# Patient Record
Sex: MALE | Race: ASIAN
[De-identification: names, ages, dates, MRNs, and addresses within clinical notes are randomized per-mention and may not be internally consistent; named-entity substitution may affect disease eponyms.]

---

## 2020-07-23 ENCOUNTER — HOSPITAL ENCOUNTER (INPATIENT)
Dept: HOSPITAL 54 - ER | Age: 85
LOS: 4 days | Discharge: ANOTHER HOSPITAL | DRG: 885 | End: 2020-07-27
Attending: INTERNAL MEDICINE | Admitting: PSYCHIATRY & NEUROLOGY
Payer: MEDICARE

## 2020-07-23 VITALS — HEIGHT: 65 IN | BODY MASS INDEX: 25.99 KG/M2 | WEIGHT: 156 LBS

## 2020-07-23 VITALS — SYSTOLIC BLOOD PRESSURE: 137 MMHG | DIASTOLIC BLOOD PRESSURE: 72 MMHG

## 2020-07-23 VITALS — SYSTOLIC BLOOD PRESSURE: 157 MMHG | DIASTOLIC BLOOD PRESSURE: 65 MMHG

## 2020-07-23 DIAGNOSIS — E87.6: ICD-10-CM

## 2020-07-23 DIAGNOSIS — R45.851: ICD-10-CM

## 2020-07-23 DIAGNOSIS — R73.9: ICD-10-CM

## 2020-07-23 DIAGNOSIS — E44.0: ICD-10-CM

## 2020-07-23 DIAGNOSIS — F33.2: Primary | ICD-10-CM

## 2020-07-23 DIAGNOSIS — E03.9: ICD-10-CM

## 2020-07-23 DIAGNOSIS — I10: ICD-10-CM

## 2020-07-23 DIAGNOSIS — E83.52: ICD-10-CM

## 2020-07-23 DIAGNOSIS — G93.40: ICD-10-CM

## 2020-07-23 LAB
ALBUMIN SERPL BCP-MCNC: 2.7 G/DL (ref 3.4–5)
ALP SERPL-CCNC: 96 U/L (ref 46–116)
ALT SERPL W P-5'-P-CCNC: 13 U/L (ref 12–78)
APAP SERPL-MCNC: < 2 UG/ML (ref 10–30)
AST SERPL W P-5'-P-CCNC: 14 U/L (ref 15–37)
BASOPHILS # BLD AUTO: 0 /CMM (ref 0–0.2)
BASOPHILS NFR BLD AUTO: 0.3 % (ref 0–2)
BILIRUB DIRECT SERPL-MCNC: 0.2 MG/DL (ref 0–0.2)
BILIRUB SERPL-MCNC: 0.4 MG/DL (ref 0.2–1)
BUN SERPL-MCNC: 10 MG/DL (ref 7–18)
CALCIUM SERPL-MCNC: 11.3 MG/DL (ref 8.5–10.1)
CHLORIDE SERPL-SCNC: 102 MMOL/L (ref 98–107)
CO2 SERPL-SCNC: 38 MMOL/L (ref 21–32)
CREAT SERPL-MCNC: 1 MG/DL (ref 0.6–1.3)
EOSINOPHIL NFR BLD AUTO: 4 % (ref 0–6)
ETHANOL SERPL-MCNC: < 3 MG/DL (ref 0–0)
GLUCOSE SERPL-MCNC: 164 MG/DL (ref 74–106)
HCT VFR BLD AUTO: 35 % (ref 39–51)
HGB BLD-MCNC: 11.8 G/DL (ref 13.5–17.5)
LYMPHOCYTES NFR BLD AUTO: 2.2 /CMM (ref 0.8–4.8)
LYMPHOCYTES NFR BLD AUTO: 25.1 % (ref 20–44)
MCHC RBC AUTO-ENTMCNC: 34 G/DL (ref 31–36)
MCV RBC AUTO: 92 FL (ref 80–96)
MONOCYTES NFR BLD AUTO: 0.7 /CMM (ref 0.1–1.3)
MONOCYTES NFR BLD AUTO: 8.2 % (ref 2–12)
NEUTROPHILS # BLD AUTO: 5.4 /CMM (ref 1.8–8.9)
NEUTROPHILS NFR BLD AUTO: 62.4 % (ref 43–81)
PH UR STRIP: 7.5 [PH] (ref 5–8)
PLATELET # BLD AUTO: 295 /CMM (ref 150–450)
POTASSIUM SERPL-SCNC: 3 MMOL/L (ref 3.5–5.1)
PROT SERPL-MCNC: 6.7 G/DL (ref 6.4–8.2)
RBC # BLD AUTO: 3.77 MIL/UL (ref 4.5–6)
SALICYLATES SERPL-MCNC: < 2.8 MG/DL (ref 2.8–20)
SODIUM SERPL-SCNC: 140 MMOL/L (ref 136–145)
UROBILINOGEN UR STRIP-MCNC: 0.2 EU/DL
WBC NRBC COR # BLD AUTO: 8.7 K/UL (ref 4.3–11)

## 2020-07-23 PROCEDURE — G0480 DRUG TEST DEF 1-7 CLASSES: HCPCS

## 2020-07-23 NOTE — NUR
GPS RN ADMISSION NOTES:



ADMITTED AN 89YO Slovenian SPEAKING MALE WHO INITIALLY CAME FROM HOME AND WAS BROUGHT INTO [facility] 
ER. PATIENT IS ON A HOLD FOR  FOR DANGER TO SELF.PER HOLD PATIENT WAS MADE SUICIDAL THREATS 
WITH A PLAN TO SHOOT HIMSELF WITH HIS OWN GUN. PATIIENT WILL BE UNDER THE CARE OF DR. MONTGOMERY AND CANDI HERNANDEZ, FANY FOR [facility] MEDICAL GROUPD AND PSYCH AND MEDICAL DOCTORS 
RESPECTIVELY. 



PATIENT WAS BROUGHT INTO THE UNIT DURING CHANGE OF SHIFT. UPON FACE TO FACE 
ASSESSMENT,PATIENT PRESENTS AS ALERT AND ORIENTED X1, SPEAKS MOSTLY Slovenian AND LESS OF THE 
ENGLISH LANGUAGE, APPEARS CONFUSED, DISORGANIZED , UNKEMPT, DISHEVELED, APPEARS LOST. 
REALITY ORIENTATION DONE. SKIN AND BODY ASSESSMENT DONE. NO OPEN WOUND OR SKIN ISSUES NOTED 
THIS TIME. GUIDE TO PRESCRIPTION MEDICATIONS BOOK AND PATIENT'S RIGHTS HANDBOOK PROVIDED. 
Q15 MIN CHECKS INITIATED. CARE PLAN SPECIFIC TO PATIENT'S NEEDS THE  FORMULATED. PROVIDED 
PATIENT WITH SOME SNACKS AS TOLERATED. WILL MONITOR PATIENT FOR MOOD, SAFETY AND BEHAVIOR 
WHILE INPATIENT.

## 2020-07-23 NOTE — NUR
PT TRANPORTED TO UNIT ON [facility] WITH EMT AT BEDSIDE. PT IS IN STABLE CONDITION. 
NAD NOTED.

## 2020-07-23 NOTE — NUR
BRISEIDA FROM HOME TO ER BED 12. ALERT AND AWAKE. Syriac SPEAKING. ABLE TO FOLLOW 
SIMPLE COMMAND. BROUGHT IN FOR MEDICAL CLEARANCE FOR ADMISSION FOR GPS. PER 
5150 HOLD, PT IS DANGER TO SELF WITH SUICIDAL THOUGHT W/ PLAN TO SHOOT HIMSELF. 
PT IS REPORTED TO BE DEPRESSED. PT IS GOWNED AND BELONGINGS KEPT IN LOCKER. 1:1 
SITTER AT BEDSIDE. MD WAS AT THE BEDSIDE FOR EVAL ORDERS RECEIVED NOTED AND 
CARRIED OUT

## 2020-07-24 VITALS — SYSTOLIC BLOOD PRESSURE: 129 MMHG | DIASTOLIC BLOOD PRESSURE: 69 MMHG

## 2020-07-24 VITALS — DIASTOLIC BLOOD PRESSURE: 67 MMHG | SYSTOLIC BLOOD PRESSURE: 120 MMHG

## 2020-07-24 VITALS — SYSTOLIC BLOOD PRESSURE: 118 MMHG | DIASTOLIC BLOOD PRESSURE: 69 MMHG

## 2020-07-24 LAB
APPEARANCE UR: CLEAR
BASOPHILS # BLD AUTO: 0 /CMM (ref 0–0.2)
BASOPHILS NFR BLD AUTO: 0.3 % (ref 0–2)
BILIRUB UR QL STRIP: NEGATIVE
BUN SERPL-MCNC: 13 MG/DL (ref 7–18)
CALCIUM SERPL-MCNC: 12.3 MG/DL (ref 8.5–10.1)
CHLORIDE SERPL-SCNC: 100 MMOL/L (ref 98–107)
CHOLEST SERPL-MCNC: 145 MG/DL (ref ?–200)
CHOLEST SERPL-MCNC: 158 MG/DL (ref ?–200)
CO2 SERPL-SCNC: 36 MMOL/L (ref 21–32)
COLOR UR: YELLOW
CREAT SERPL-MCNC: 1.2 MG/DL (ref 0.6–1.3)
CREAT UR-MCNC: 107.2 MG/DL (ref 30–125)
EOSINOPHIL NFR BLD AUTO: 0.9 % (ref 0–6)
GLUCOSE SERPL-MCNC: 137 MG/DL (ref 74–106)
GLUCOSE UR STRIP-MCNC: NEGATIVE MG/DL
HCT VFR BLD AUTO: 37 % (ref 39–51)
HDLC SERPL-MCNC: 41 MG/DL (ref 40–60)
HDLC SERPL-MCNC: 49 MG/DL (ref 40–60)
HGB BLD-MCNC: 12.6 G/DL (ref 13.5–17.5)
HGB UR QL STRIP: NEGATIVE ERY/UL
KETONES UR STRIP-MCNC: NEGATIVE MG/DL
LDLC SERPL DIRECT ASSAY-MCNC: 96 MG/DL (ref 0–99)
LDLC SERPL DIRECT ASSAY-MCNC: 99 MG/DL (ref 0–99)
LEUKOCYTE ESTERASE UR QL STRIP: NEGATIVE
LYMPHOCYTES NFR BLD AUTO: 2.2 /CMM (ref 0.8–4.8)
LYMPHOCYTES NFR BLD AUTO: 21.3 % (ref 20–44)
MCHC RBC AUTO-ENTMCNC: 34 G/DL (ref 31–36)
MCV RBC AUTO: 92 FL (ref 80–96)
MONOCYTES NFR BLD AUTO: 0.8 /CMM (ref 0.1–1.3)
MONOCYTES NFR BLD AUTO: 7.3 % (ref 2–12)
NEUTROPHILS # BLD AUTO: 7.2 /CMM (ref 1.8–8.9)
NEUTROPHILS NFR BLD AUTO: 70.2 % (ref 43–81)
NITRITE UR QL STRIP: NEGATIVE
PH UR STRIP: 6 [PH] (ref 5–8)
PLATELET # BLD AUTO: 365 /CMM (ref 150–450)
POTASSIUM SERPL-SCNC: 3 MMOL/L (ref 3.5–5.1)
PROT UR QL STRIP: NEGATIVE MG/DL
PROT UR-MCNC: 44.4 MG/DL (ref 0–11.9)
PSA FREE SERPL-MCNC: 0.87 NG/ML (ref 0–45)
PSA SERPL-MCNC: 3.28 NG/ML (ref 0–4)
RBC # BLD AUTO: 4.05 MIL/UL (ref 4.5–6)
SODIUM SERPL-SCNC: 142 MMOL/L (ref 136–145)
SODIUM UR-SCNC: 13 MMOL/L (ref 40–220)
TRIGL SERPL-MCNC: 82 MG/DL (ref 30–150)
TRIGL SERPL-MCNC: 83 MG/DL (ref 30–150)
UROBILINOGEN UR STRIP-MCNC: 0.2 EU/DL
WBC NRBC COR # BLD AUTO: 10.3 K/UL (ref 4.3–11)

## 2020-07-24 RX ADMIN — TEMAZEPAM PRN MG: 7.5 CAPSULE ORAL at 00:30

## 2020-07-24 RX ADMIN — POTASSIUM CHLORIDE SCH MEQ: 750 TABLET, FILM COATED, EXTENDED RELEASE ORAL at 12:55

## 2020-07-24 RX ADMIN — SERTRALINE HYDROCHLORIDE SCH MG: 25 TABLET, FILM COATED ORAL at 12:55

## 2020-07-24 RX ADMIN — TEMAZEPAM PRN MG: 7.5 CAPSULE ORAL at 23:46

## 2020-07-24 RX ADMIN — POTASSIUM CHLORIDE SCH MEQ: 750 TABLET, FILM COATED, EXTENDED RELEASE ORAL at 15:40

## 2020-07-24 RX ADMIN — ATORVASTATIN CALCIUM SCH MG: 40 TABLET, FILM COATED ORAL at 22:13

## 2020-07-24 NOTE — NUR
Social Work Initial Discharge Plan: Patient currently resides alone at [address] [address]. Per pt's Day Care [facility] ([phone]) 
stated that pt is part of there program and that a nurse follows up with pt 2-3 times. This 
writer attempted to contact pt's son Tosha Sanchez ([phone]) was unavailable. Per pt's 
nurse from the day care center, she stated that they are unable to reach pt's son.

## 2020-07-24 NOTE — NUR
Social Work Family Contact:  attempted to contact patient's son Tosha Sanchez 
([phone]) and was unavailable to gather collateral.

## 2020-07-24 NOTE — NUR
RN NOTES: INSOMNIA 

NOTED PT. UNABLE TO SLEEP , RESTORIL 7.5 MG PO PRN GIVEN AS ORDERS, WILL CONTINUE TO 
MONITOR.

## 2020-07-24 NOTE — NUR
Social Work Note:



 contacted patient's Day Care [facility] ([phone]) to 
gather collateral. Per Britton RN, she stated that pt does not have any family members here and 
all of his family is in [place]. Per Britton, she stated that pt has one son Tosha Sanchez 
([phone]) and stated that they are unable to reach him. Per Britton, she stated that pt 
resides alone and that they follow up with pt 2-3 times a day to check on his status. Per 
Britton, she stated that pt does not have DPOA or Conservatorship.

## 2020-07-25 VITALS — SYSTOLIC BLOOD PRESSURE: 131 MMHG | DIASTOLIC BLOOD PRESSURE: 74 MMHG

## 2020-07-25 VITALS — DIASTOLIC BLOOD PRESSURE: 67 MMHG | SYSTOLIC BLOOD PRESSURE: 142 MMHG

## 2020-07-25 VITALS — SYSTOLIC BLOOD PRESSURE: 139 MMHG | DIASTOLIC BLOOD PRESSURE: 93 MMHG

## 2020-07-25 LAB
ALBUMIN SERPL BCP-MCNC: 2.7 G/DL (ref 3.4–5)
ALP SERPL-CCNC: 81 U/L (ref 46–116)
ALT SERPL W P-5'-P-CCNC: 15 U/L (ref 12–78)
AST SERPL W P-5'-P-CCNC: 24 U/L (ref 15–37)
BASOPHILS # BLD AUTO: 0 /CMM (ref 0–0.2)
BASOPHILS NFR BLD AUTO: 0.2 % (ref 0–2)
BILIRUB SERPL-MCNC: 0.6 MG/DL (ref 0.2–1)
BUN SERPL-MCNC: 16 MG/DL (ref 7–18)
CALCIUM SERPL-MCNC: 12 MG/DL (ref 8.5–10.1)
CHLORIDE SERPL-SCNC: 100 MMOL/L (ref 98–107)
CO2 SERPL-SCNC: 35 MMOL/L (ref 21–32)
CREAT SERPL-MCNC: 1.2 MG/DL (ref 0.6–1.3)
EOSINOPHIL NFR BLD AUTO: 2.3 % (ref 0–6)
GLUCOSE SERPL-MCNC: 132 MG/DL (ref 74–106)
HCT VFR BLD AUTO: 33 % (ref 39–51)
HGB BLD-MCNC: 11.1 G/DL (ref 13.5–17.5)
LYMPHOCYTES NFR BLD AUTO: 1.6 /CMM (ref 0.8–4.8)
LYMPHOCYTES NFR BLD AUTO: 16.9 % (ref 20–44)
MAGNESIUM SERPL-MCNC: 2.4 MG/DL (ref 1.8–2.4)
MCHC RBC AUTO-ENTMCNC: 34 G/DL (ref 31–36)
MCV RBC AUTO: 93 FL (ref 80–96)
MONOCYTES NFR BLD AUTO: 0.5 /CMM (ref 0.1–1.3)
MONOCYTES NFR BLD AUTO: 5.4 % (ref 2–12)
NEUTROPHILS # BLD AUTO: 6.9 /CMM (ref 1.8–8.9)
NEUTROPHILS NFR BLD AUTO: 75.2 % (ref 43–81)
PHOSPHATE SERPL-MCNC: 3.6 MG/DL (ref 2.5–4.9)
PLATELET # BLD AUTO: 272 /CMM (ref 150–450)
POTASSIUM SERPL-SCNC: 3.7 MMOL/L (ref 3.5–5.1)
PROT SERPL-MCNC: 6.2 G/DL (ref 6.4–8.2)
RBC # BLD AUTO: 3.52 MIL/UL (ref 4.5–6)
SODIUM SERPL-SCNC: 139 MMOL/L (ref 136–145)
WBC NRBC COR # BLD AUTO: 9.2 K/UL (ref 4.3–11)

## 2020-07-25 RX ADMIN — GABAPENTIN SCH MG: 100 CAPSULE ORAL at 16:40

## 2020-07-25 RX ADMIN — ATORVASTATIN CALCIUM SCH MG: 40 TABLET, FILM COATED ORAL at 21:26

## 2020-07-25 RX ADMIN — GABAPENTIN SCH MG: 100 CAPSULE ORAL at 12:12

## 2020-07-25 RX ADMIN — SERTRALINE HYDROCHLORIDE SCH MG: 25 TABLET, FILM COATED ORAL at 12:12

## 2020-07-25 NOTE — NUR
RN NOTES : NO CHANGE OF CONDITION NOTED , AND PT. RESTING WELL, PT. WAS TRYING TO CLIMBING 
OUT THE BED AT THIS TIME , AND TRANSFER TO THE REN CHAIR, WILL CONTINUITY WITH CARE.

## 2020-07-26 VITALS — DIASTOLIC BLOOD PRESSURE: 52 MMHG | SYSTOLIC BLOOD PRESSURE: 106 MMHG

## 2020-07-26 VITALS — DIASTOLIC BLOOD PRESSURE: 72 MMHG | SYSTOLIC BLOOD PRESSURE: 144 MMHG

## 2020-07-26 VITALS — DIASTOLIC BLOOD PRESSURE: 78 MMHG | SYSTOLIC BLOOD PRESSURE: 143 MMHG

## 2020-07-26 RX ADMIN — ATORVASTATIN CALCIUM SCH MG: 40 TABLET, FILM COATED ORAL at 21:25

## 2020-07-26 RX ADMIN — GABAPENTIN SCH MG: 100 CAPSULE ORAL at 07:43

## 2020-07-26 RX ADMIN — Medication SCH OZ: at 07:43

## 2020-07-26 RX ADMIN — GABAPENTIN SCH MG: 100 CAPSULE ORAL at 16:40

## 2020-07-26 RX ADMIN — GABAPENTIN SCH MG: 100 CAPSULE ORAL at 12:10

## 2020-07-26 RX ADMIN — SERTRALINE HYDROCHLORIDE SCH MG: 25 TABLET, FILM COATED ORAL at 12:10

## 2020-07-26 NOTE — NUR
GPS RN NOTES:



NURSE ASSIGNED REPORTED TO WRITER OF PATIENT'S SKIN CONDITION IN THE SACRAL AREA. NOTIFIED 
SUPERVISOR RAHEEM VOSS, NOTIFIED CANDI HERNANDEZ NP FOR [employer].  WILL 
NOTIFY JUSTICE MCGHEE IN THE  MORNING. 



WOUND CARE CONSULT TRIGGERED. MEPILEX DRESSING APPLIED. WILL CONTINUE TO MONITOR PATIENT 
WHILE IN HOUSE.

## 2020-07-27 ENCOUNTER — HOSPITAL ENCOUNTER (INPATIENT)
Dept: HOSPITAL 54 - MED | Age: 85
LOS: 3 days | Discharge: NURSING, REHAB OR LONG-TERM CARE | DRG: 641 | End: 2020-07-30
Attending: INTERNAL MEDICINE | Admitting: INTERNAL MEDICINE
Payer: MEDICARE

## 2020-07-27 VITALS — HEIGHT: 65 IN | WEIGHT: 110 LBS | BODY MASS INDEX: 18.33 KG/M2

## 2020-07-27 VITALS — DIASTOLIC BLOOD PRESSURE: 57 MMHG | SYSTOLIC BLOOD PRESSURE: 94 MMHG

## 2020-07-27 VITALS — SYSTOLIC BLOOD PRESSURE: 108 MMHG | DIASTOLIC BLOOD PRESSURE: 59 MMHG

## 2020-07-27 VITALS — SYSTOLIC BLOOD PRESSURE: 138 MMHG | DIASTOLIC BLOOD PRESSURE: 66 MMHG

## 2020-07-27 DIAGNOSIS — R45.851: ICD-10-CM

## 2020-07-27 DIAGNOSIS — E87.6: ICD-10-CM

## 2020-07-27 DIAGNOSIS — R73.9: ICD-10-CM

## 2020-07-27 DIAGNOSIS — E87.0: ICD-10-CM

## 2020-07-27 DIAGNOSIS — E44.0: ICD-10-CM

## 2020-07-27 DIAGNOSIS — F32.9: ICD-10-CM

## 2020-07-27 DIAGNOSIS — I10: ICD-10-CM

## 2020-07-27 DIAGNOSIS — E83.52: Primary | ICD-10-CM

## 2020-07-27 DIAGNOSIS — E67.3: ICD-10-CM

## 2020-07-27 DIAGNOSIS — R13.10: ICD-10-CM

## 2020-07-27 DIAGNOSIS — E03.9: ICD-10-CM

## 2020-07-27 LAB
ALBUMIN SERPL BCP-MCNC: 2.7 G/DL (ref 3.4–5)
ALP SERPL-CCNC: 77 U/L (ref 46–116)
ALT SERPL W P-5'-P-CCNC: 20 U/L (ref 12–78)
AST SERPL W P-5'-P-CCNC: 32 U/L (ref 15–37)
BASOPHILS # BLD AUTO: 0 /CMM (ref 0–0.2)
BASOPHILS NFR BLD AUTO: 0.2 % (ref 0–2)
BILIRUB SERPL-MCNC: 0.9 MG/DL (ref 0.2–1)
BUN SERPL-MCNC: 20 MG/DL (ref 7–18)
CALCIUM SERPL-MCNC: 12.3 MG/DL (ref 8.5–10.1)
CHLORIDE SERPL-SCNC: 100 MMOL/L (ref 98–107)
CO2 SERPL-SCNC: 33 MMOL/L (ref 21–32)
CREAT SERPL-MCNC: 1.2 MG/DL (ref 0.6–1.3)
EOSINOPHIL NFR BLD AUTO: 1.9 % (ref 0–6)
GLUCOSE SERPL-MCNC: 88 MG/DL (ref 74–106)
HCT VFR BLD AUTO: 32 % (ref 39–51)
HGB BLD-MCNC: 11 G/DL (ref 13.5–17.5)
LYMPHOCYTES NFR BLD AUTO: 1.3 /CMM (ref 0.8–4.8)
LYMPHOCYTES NFR BLD AUTO: 12.8 % (ref 20–44)
MAGNESIUM SERPL-MCNC: 2.6 MG/DL (ref 1.8–2.4)
MCHC RBC AUTO-ENTMCNC: 34 G/DL (ref 31–36)
MCV RBC AUTO: 93 FL (ref 80–96)
MONOCYTES NFR BLD AUTO: 0.6 /CMM (ref 0.1–1.3)
MONOCYTES NFR BLD AUTO: 5.7 % (ref 2–12)
NEUTROPHILS # BLD AUTO: 8 /CMM (ref 1.8–8.9)
NEUTROPHILS NFR BLD AUTO: 79.4 % (ref 43–81)
PHOSPHATE SERPL-MCNC: 3 MG/DL (ref 2.5–4.9)
PLATELET # BLD AUTO: 293 /CMM (ref 150–450)
POTASSIUM SERPL-SCNC: 3.2 MMOL/L (ref 3.5–5.1)
PROT SERPL-MCNC: 6.6 G/DL (ref 6.4–8.2)
RBC # BLD AUTO: 3.49 MIL/UL (ref 4.5–6)
SODIUM SERPL-SCNC: 139 MMOL/L (ref 136–145)
TSH SERPL DL<=0.005 MIU/L-ACNC: 7.37 UIU/ML (ref 0.36–3.74)
WBC NRBC COR # BLD AUTO: 10.1 K/UL (ref 4.3–11)

## 2020-07-27 PROCEDURE — G0378 HOSPITAL OBSERVATION PER HR: HCPCS

## 2020-07-27 PROCEDURE — A4349 DISPOSABLE MALE EXTERNAL CAT: HCPCS

## 2020-07-27 RX ADMIN — GABAPENTIN SCH MG: 100 CAPSULE ORAL at 08:25

## 2020-07-27 RX ADMIN — ATORVASTATIN CALCIUM SCH MG: 40 TABLET, FILM COATED ORAL at 22:09

## 2020-07-27 RX ADMIN — DEXTROSE AND SODIUM CHLORIDE PRN MLS/HR: 5; 900 INJECTION, SOLUTION INTRAVENOUS at 18:24

## 2020-07-27 RX ADMIN — SERTRALINE HYDROCHLORIDE SCH MG: 25 TABLET, FILM COATED ORAL at 12:30

## 2020-07-27 RX ADMIN — ENOXAPARIN SODIUM SCH MG: 30 INJECTION SUBCUTANEOUS at 18:22

## 2020-07-27 RX ADMIN — GABAPENTIN SCH MG: 100 CAPSULE ORAL at 12:30

## 2020-07-27 RX ADMIN — Medication SCH OZ: at 08:45

## 2020-07-27 NOTE — NUR
RN OPENING NOTES



Received patient asleep on bed, on RA, no s/sx of distress noted. With sitter at bedside. 
IVF infusing as ordered. Will continue to monitor accordingly.

## 2020-07-27 NOTE — NUR
RN CLOSING NOTES



PATIENT IN STABLE CONDITION. NOT IN ANY FORM OF DISTRESS. SITTER AT BEDSIDE. SAFETY MEASURES 
IN PLACE. DENIED SI/HI. BED IN LOW/LOCKED POSITION. SIDERAILS UPX2, CALL LIGHT IN REACH 
ENDORSED TO ARTURO AGUILLON FOR SHENG.

## 2020-07-27 NOTE — NUR
RN NOTE- PT ASLEEP THOUGH AWAKENS EASILY. ORIENTED TO SELF. CONFUSED THIN PO INTAKE POOR MED 
COMPLIANT THIS AM. TURNED AND REPOSITIONED FREQUENTLY TO PREVENT BREAKDOWN

## 2020-07-27 NOTE — NUR
RN DC NOTE- PT DC TO [facility] AT THIS TIME VIA [facility] AND ACCOMPANIED BY THIS RN. DX- 
HYPERCALCEMIA. PT ALERT ORIENTED TO SELF ONLY, CONFUSED, FLAT AFFECT CALM AND WITHDRAWN. PT 
MED COMPLIANT AND PO INTAKE HAS BEEN POOR-FAIR DEPENDING ON MEAL. SLEEPING HRS HAVE BEEN 
POOR AT 4-5 HRS NOC. ERYTHEMA TO SACRAL AREA, WOUND CARE CONSULT PENDING, POSITIONED PT 
FREQUENTLY FOR COMFORT. VS STABLE. VALUABLES TAKEN TO [address] [place] 5250 HOLD STILL IN PLACE PER DR MONTGOMERY

## 2020-07-27 NOTE — NUR
RECEIVED PATIENT FROM [facility] GPS. HIRA TIN STABLE CONDITION. A/OX1-2, RESPONSIVE, Turkish 
SPEAKING, UNDERSTAND LITTLE ENGLISH. NOT IN EFREN FORM OF DISTRESS. NO SOB. DENIED PAIN OR 
DISCOMFORT AT THIS TIME. SITUATED PATIENT IN THE ROOM. SITTER AT BEDSIDE. VSS. SAFETY 
MEASURES IN PLACE. BED IN LOW/LOCKED PSOTIION, SIDERAILS UPX2, CALL LIGHT IN REACH. WILL 
MONITOR ACCORDINGLY.

## 2020-07-27 NOTE — NUR
RN NOTES



Received a call form Alberto of Pharmacy. Medication Pamidronate (Aredia) is not available. 
Ordering MD, Dr. Bowie notified. 

-------------------------------------------------------------------------------

Addendum: 07/27/20 at 2053 by PAL SOTO RN

-------------------------------------------------------------------------------

Dr. Bowie notified, per pharmacy, spoke to Alberto, Pamidronate (Aredia) will be available a 
day after tomorrow, no available alternative for peripheral route at this time. Informed Dr. Bowie, NNO.

## 2020-07-27 NOTE — NUR
Social Work Transfer: 



Patient is transferred to Medr unit due to Hypercalcemia. Dr. Orosco will continue the 
hold. Patient lives at [address], [place] [address]. Patient has one 
son Tosha Sanchez ([phone]) this writer was unable to reach him and he has not called 
back. Patient attends to a Day Care Center called [facility] ([phone]) who 
stated that the son does not  phone calls. Patient's RN Britton from [facility] ([phone]) follows-up with patient. Patient does not have any other family 
members. Patient might require a SNF.

## 2020-07-27 NOTE — NUR
Social Work Transfer: 



Patient is transferred to Medr unit due to Hypercalcemia. Dr. Orosco will continue the 
hold. Patient lives at [address], [place] [address]. Patient has one 
son Tosha Sanchez ([phone]) this writer was unable to reach him and he has not called 
back. Patient attends to a Day Care Center called [facility] ([phone]) who 
stated that the son does not  phone calls. Patient's RN Britton from [facility] ([phone]) follows-up with patient. Patient does not have any other family 
members. Patient might require a SNF.

## 2020-07-28 VITALS — DIASTOLIC BLOOD PRESSURE: 51 MMHG | SYSTOLIC BLOOD PRESSURE: 94 MMHG

## 2020-07-28 LAB
ALBUMIN SERPL ELPH-MCNC: 2.7 G/DL (ref 2.9–4.4)
ALBUMIN/GLOB SERPL: 0.9 {RATIO} (ref 0.7–1.7)
ALPHA1 GLOB SERPL ELPH-MCNC: 0.3 G/DL (ref 0–0.4)
ALPHA2 GLOB SERPL ELPH-MCNC: 0.6 G/DL (ref 0.4–1)
B-GLOBULIN SERPL ELPH-MCNC: 0.8 G/DL (ref 0.7–1.3)
BASOPHILS # BLD AUTO: 0 /CMM (ref 0–0.2)
BASOPHILS NFR BLD AUTO: 0.1 % (ref 0–2)
BUN SERPL-MCNC: 12 MG/DL (ref 7–18)
CALCIUM SERPL-MCNC: 11.3 MG/DL (ref 8.5–10.1)
CHLORIDE SERPL-SCNC: 107 MMOL/L (ref 98–107)
CO2 SERPL-SCNC: 34 MMOL/L (ref 21–32)
CREAT SERPL-MCNC: 1 MG/DL (ref 0.6–1.3)
EOSINOPHIL NFR BLD AUTO: 0.3 % (ref 0–6)
FERRITIN SERPL-MCNC: 730 NG/ML (ref 8–388)
GAMMA GLOB SERPL ELPH-MCNC: 1.3 G/DL (ref 0.4–1.8)
GLOBULIN SER CALC-MCNC: 2.9 G/DL (ref 2.2–3.9)
GLUCOSE SERPL-MCNC: 119 MG/DL (ref 74–106)
HCT VFR BLD AUTO: 32 % (ref 39–51)
HGB BLD-MCNC: 10.7 G/DL (ref 13.5–17.5)
IRON SERPL-MCNC: 25 UG/DL (ref 50–175)
LYMPHOCYTES NFR BLD AUTO: 1 /CMM (ref 0.8–4.8)
LYMPHOCYTES NFR BLD AUTO: 9.1 % (ref 20–44)
MAGNESIUM SERPL-MCNC: 2.1 MG/DL (ref 1.8–2.4)
MCHC RBC AUTO-ENTMCNC: 34 G/DL (ref 31–36)
MCV RBC AUTO: 93 FL (ref 80–96)
MONOCYTES NFR BLD AUTO: 0.7 /CMM (ref 0.1–1.3)
MONOCYTES NFR BLD AUTO: 6.9 % (ref 2–12)
NEUTROPHILS # BLD AUTO: 9 /CMM (ref 1.8–8.9)
NEUTROPHILS NFR BLD AUTO: 83.6 % (ref 43–81)
PHOSPHATE SERPL-MCNC: 2.8 MG/DL (ref 2.5–4.9)
PLATELET # BLD AUTO: 315 /CMM (ref 150–450)
POTASSIUM SERPL-SCNC: 3.6 MMOL/L (ref 3.5–5.1)
RBC # BLD AUTO: 3.39 MIL/UL (ref 4.5–6)
SODIUM SERPL-SCNC: 145 MMOL/L (ref 136–145)
TIBC SERPL-MCNC: 111 UG/DL (ref 250–450)
WBC NRBC COR # BLD AUTO: 10.7 K/UL (ref 4.3–11)

## 2020-07-28 RX ADMIN — CLOTRIMAZOLE SCH GM: 1 CREAM TOPICAL at 17:14

## 2020-07-28 RX ADMIN — Medication PRN EACH: at 01:40

## 2020-07-28 RX ADMIN — DEXTROSE AND SODIUM CHLORIDE PRN MLS/HR: 5; 900 INJECTION, SOLUTION INTRAVENOUS at 16:28

## 2020-07-28 RX ADMIN — ENOXAPARIN SODIUM SCH MG: 30 INJECTION SUBCUTANEOUS at 17:16

## 2020-07-28 RX ADMIN — GABAPENTIN SCH MG: 100 CAPSULE ORAL at 13:00

## 2020-07-28 RX ADMIN — QUETIAPINE ONE MG: 25 TABLET, FILM COATED ORAL at 01:40

## 2020-07-28 RX ADMIN — GABAPENTIN SCH MG: 100 CAPSULE ORAL at 08:40

## 2020-07-28 RX ADMIN — ATORVASTATIN CALCIUM SCH MG: 40 TABLET, FILM COATED ORAL at 21:33

## 2020-07-28 RX ADMIN — QUETIAPINE ONE MG: 25 TABLET, FILM COATED ORAL at 01:30

## 2020-07-28 RX ADMIN — Medication SCH ML: at 17:00

## 2020-07-28 RX ADMIN — SERTRALINE HYDROCHLORIDE SCH MG: 25 TABLET, FILM COATED ORAL at 17:44

## 2020-07-28 RX ADMIN — DEXTROSE AND SODIUM CHLORIDE PRN MLS/HR: 5; 900 INJECTION, SOLUTION INTRAVENOUS at 04:06

## 2020-07-28 RX ADMIN — Medication PRN EACH: at 10:41

## 2020-07-28 RX ADMIN — GABAPENTIN SCH MG: 100 CAPSULE ORAL at 17:14

## 2020-07-28 NOTE — NUR
MS/RN OPENING NOTES

RECEIVED PATIENT ON BED AWAKE AND ALERT X 1-2. NO SOB NOTED. NO COMPLAINED OF PAIN THIS 
TIME. WILL CONTINUE TO MONITOR.

## 2020-07-28 NOTE — NUR
RN CLOSING NOTES



Pt asleep on bed. No complaints or s/sx of distress noted at this time. Pt was able to be 
calmed with IV Ativan as ordered. All nursing needs attended. Due meds given as ordered. 
Kept on bed clean, dry and comfortable. Sitter at bedside. Endorsed.

## 2020-07-28 NOTE — NUR
MS/RN NOTES

PATIENT COMPLAINED OF PAIN AND ANXIETY. TYLENOL 650 MG P.O.  AND ATIVAN 0.5 MG P.O WAS 
GIVEN. WILL CONTINUE TO MONITOR.

## 2020-07-28 NOTE — NUR
MS RN OPENING NOTE 



RECEIVED PATIENT IN BED. A/OX1 -2. Kiswahili SPEAKING. TOLERATING ROOM AIR. RESPIRATIONS ARE 
EVEN AND UNLABORED. NO S/S SOB NOTED. NO C/O PAIN AT THIS TIME. NO SI IDEATION AT THIS TIME. 
SITTER  AT BEDSIDE. IN NO APPARENT DISTRESS. IV ACCESS IN RFA#22 RUNNING D5NS@100ML/HR. 
CONDOM CATH IS PRESENT AND DRAINING TO GRAVITY. BED IS LOW AND LOCKED, HOB ELEVATED IN SEMI 
FOWLERS, SIDE RIALS UP X2. CALL LIGHT WITHIN REACH. WILL CONTINUE TO MONITOR.

## 2020-07-28 NOTE — NUR
WOUND CARE CONSULT: PT PRESENTS WITH SACRAL SCAR WITH CALLUS AND RASH/REDNESS TO LOWER 
BUTTOCKS AND PERINEUM, PRESENT ON ADMISSION. RECOMMENDATIONS MADE FOR SKIN PROTECTION. 
DISCUSSED WITH NURSING STAFF. PT IS INCONTINENT. SITTER AT BEDSIDE. 

-------------------------------------------------------------------------------

Addendum: 07/28/20 at 0942 by MARGARITO POLK WNDNU

-------------------------------------------------------------------------------

Amended: Links added.

## 2020-07-28 NOTE — NUR
MS/RN CLOSING NOTES

PATIENT IS ON BED. ALERT AND ORIENTED X1-2. PATIENT IN NO APPARENT RESPIRATORY DISTRESS 
NOTED. PATIENT NO SIGN AND SYMPTOM OF PAIN NOTED AT THIS TIME. IV ACCESS AT RIGHT FOREARM # 
22G WITH IV FLUID OF  D5NS 1 L  ML/HR ON AND INFUSING WELL. SEEN AND EXAMINED BY MD 
WITH ORDERS MADE AND CARRIED OUT. ALL DUE MEDICATION WAS GIVEN. SAFETY PRECAUTION WAS IN 
PLACED. BED IN LOWEST POSITION AND LOCKED. SIDE RAILS UP X2. CALL LIGHT WITHIN REACH. 
PATIENT DID NOT EAT WELL MD IS AWARE. WILL ENDORSED TO NIGHT SHIFT FOR SHENG.

## 2020-07-28 NOTE — NUR
MS/RN NOTES

DR. MONTGOMERY ORDER ATIVAN 0.25 MG P.O. EVERY 6 HOURS PRN, ATIVAN 0.25 MG IV EVERY 6 HOURS PRN 
AND ENSURE IF THE MD IS OKAY WITH ENSURE. NOTED AND CARRIED OUT.

## 2020-07-28 NOTE — NUR
RN NOTES



Pt noted severly agitated and restless, getting off the bed, unable to obey simple commands. 
Pt refused any PO intake, spit out the oral meds given as ordered. On call MD notified, 1 mg 
Ativan ordered received. Given as ordered. Sitter at bedside. Will continue to monitor 
accordingly.

## 2020-07-28 NOTE — NUR
MS/RN NOTES

PATIENT WITH SIGN AND SYMPTOM OF PAIN NOTED. PATIENT WITH GRIMACE FACE AND RESTLESS. NORCO 
5/325MG P.O. 1 TAB WAS GIVEN WILL CONTINUE TO MONITOR.

## 2020-07-29 VITALS — DIASTOLIC BLOOD PRESSURE: 65 MMHG | SYSTOLIC BLOOD PRESSURE: 109 MMHG

## 2020-07-29 VITALS — DIASTOLIC BLOOD PRESSURE: 71 MMHG | SYSTOLIC BLOOD PRESSURE: 155 MMHG

## 2020-07-29 VITALS — SYSTOLIC BLOOD PRESSURE: 135 MMHG | DIASTOLIC BLOOD PRESSURE: 70 MMHG

## 2020-07-29 LAB
BASOPHILS # BLD AUTO: 0 /CMM (ref 0–0.2)
BASOPHILS NFR BLD AUTO: 0.3 % (ref 0–2)
BUN SERPL-MCNC: 13 MG/DL (ref 7–18)
CALCIUM SERPL-MCNC: 11 MG/DL (ref 8.5–10.1)
CHLORIDE SERPL-SCNC: 111 MMOL/L (ref 98–107)
CO2 SERPL-SCNC: 31 MMOL/L (ref 21–32)
CREAT SERPL-MCNC: 1 MG/DL (ref 0.6–1.3)
EOSINOPHIL NFR BLD AUTO: 0.8 % (ref 0–6)
GLUCOSE SERPL-MCNC: 128 MG/DL (ref 74–106)
HCT VFR BLD AUTO: 34 % (ref 39–51)
HGB BLD-MCNC: 11.5 G/DL (ref 13.5–17.5)
IGA SERPL-MCNC: 222 MG/DL (ref 61–437)
IGM SERPL-MCNC: 67 MG/DL (ref 15–143)
LYMPHOCYTES NFR BLD AUTO: 1.2 /CMM (ref 0.8–4.8)
LYMPHOCYTES NFR BLD AUTO: 10.2 % (ref 20–44)
MAGNESIUM SERPL-MCNC: 2.4 MG/DL (ref 1.8–2.4)
MCHC RBC AUTO-ENTMCNC: 34 G/DL (ref 31–36)
MCV RBC AUTO: 96 FL (ref 80–96)
MONOCYTES NFR BLD AUTO: 0.7 /CMM (ref 0.1–1.3)
MONOCYTES NFR BLD AUTO: 6.2 % (ref 2–12)
NEUTROPHILS # BLD AUTO: 9.9 /CMM (ref 1.8–8.9)
NEUTROPHILS NFR BLD AUTO: 82.5 % (ref 43–81)
PHOSPHATE SERPL-MCNC: 2.7 MG/DL (ref 2.5–4.9)
PLATELET # BLD AUTO: 271 /CMM (ref 150–450)
POTASSIUM SERPL-SCNC: 4.1 MMOL/L (ref 3.5–5.1)
RBC # BLD AUTO: 3.56 MIL/UL (ref 4.5–6)
SODIUM SERPL-SCNC: 148 MMOL/L (ref 136–145)
WBC NRBC COR # BLD AUTO: 11.9 K/UL (ref 4.3–11)

## 2020-07-29 RX ADMIN — SERTRALINE HYDROCHLORIDE SCH MG: 25 TABLET, FILM COATED ORAL at 12:30

## 2020-07-29 RX ADMIN — GABAPENTIN SCH MG: 100 CAPSULE ORAL at 16:42

## 2020-07-29 RX ADMIN — CLOTRIMAZOLE SCH APPLIC: 1 CREAM TOPICAL at 08:29

## 2020-07-29 RX ADMIN — SODIUM CHLORIDE PRN MLS/HR: 4.5 INJECTION, SOLUTION INTRAVENOUS at 18:22

## 2020-07-29 RX ADMIN — Medication SCH ML: at 08:28

## 2020-07-29 RX ADMIN — DEXTROSE AND SODIUM CHLORIDE PRN MLS/HR: 5; 900 INJECTION, SOLUTION INTRAVENOUS at 05:09

## 2020-07-29 RX ADMIN — ENOXAPARIN SODIUM SCH MG: 30 INJECTION SUBCUTANEOUS at 17:05

## 2020-07-29 RX ADMIN — Medication SCH ML: at 16:42

## 2020-07-29 RX ADMIN — SERTRALINE HYDROCHLORIDE SCH MG: 25 TABLET, FILM COATED ORAL at 16:42

## 2020-07-29 RX ADMIN — Medication SCH ML: at 12:31

## 2020-07-29 RX ADMIN — CLOTRIMAZOLE SCH APPLIC: 1 CREAM TOPICAL at 16:42

## 2020-07-29 RX ADMIN — ATORVASTATIN CALCIUM SCH MG: 40 TABLET, FILM COATED ORAL at 21:50

## 2020-07-29 RX ADMIN — GABAPENTIN SCH MG: 100 CAPSULE ORAL at 12:30

## 2020-07-29 RX ADMIN — GABAPENTIN SCH MG: 100 CAPSULE ORAL at 08:29

## 2020-07-29 NOTE — NUR
MS RN CLOSING NOTE 



PATIENT IN BED. A/OX1 -2. REMAINS TOLERATING ROOM AIR. RESPIRATIONS ARE EVEN AND UNLABORED. 
NO S/S PAIN T/O SHIFT. NO SI IDEATION T/O SHIFT. SITTER REMAINS AT BEDSIDE. NO DISTRESS. IV 
ACCESS  MAINTAINED IN RFA#22 RUNNING D5NS@100ML/HR. CONDOM CATH IS MAINTAINED AND DRAINING 
TO GRAVITY. BED REMAINS LOW AND LOCKED, HOB ELEVATED IN SEMI FOWLERS, SIDE RIALS UP X2. CALL 
LIGHT WITHIN REACH. WILL ENDORSE TO NEXT SHIFT.

## 2020-07-29 NOTE — NUR
MS/RN OPENING NOTES



Patient resting in bed, A&O x 1, Croatian speaking. Sitter at bedside. No complaints of 
pain/discomfort noted at this time. Breathing even and non-labored on room air, no SOB 
noted. No cardiac distress noted. IV access on RFA #22, patent and intact, and running D5NS 
@100 ml/hr. No infiltration/infection/bleeding noted on site. Condom catheter in place, 
draining yellow urine output well. Sensation from all peripheral extremities intact. Fall 
precautions maintained. Will continue current medical management.

## 2020-07-29 NOTE — NUR
MS RN OPENING NOTES

PATIENT RESTING IN BED, RESTLESS AND AGITATED. 1:1 SITTER PRESENT AT THE BEDSIDE FOR PATIENT 
SAFETY. ON 5250 HOLD; START DATE 7/23/20. A/OX 1-2; PRIMARY LANGUAGE Malay. ON RA; NO S/S 
OF ACUTE RESPIRATORY DISTRESS; BREATHING IS EVEN AND UNLABORED. IV PRESENT ON FA, SIZE 22, 
INTACT & PATENT WITH 1/2 NS RUNNING AT 75 ML/HR. CONDOM CATH PRESENT AND DRAINING WELL. 
SAFETY MEASURES IN PLACE AND PATIENT'S NEEDS MET. BED LOCKED, ALARM ON, SIDE RAILS X3, CALL 
LIGHT WITHIN REACH. WILL CONTINUE TO MONITOR.

## 2020-07-29 NOTE — NUR
MS/RN CLOSING NOTES



Patient resting in bed, A&O x 1, Occitan speaking. Sitter at bedside. Denies any 
pain/discomfort noted at this time. Breathing even and non-labored on room air. No 
respiratory or cardiac distress noted. IV access on RFA #22, patent and intact, and running 
1/2NS @75 ml/hr. No infiltration/infection/bleeding noted on site. Condom catheter in place, 
draining yellow urine output well, 900 cc output for the shift.. Sensation from all 
peripheral extremities intact. Fall precautions maintained. Was able to tolerate pureed diet 
and nectar thickened liquids, ate 50% during lunch, 75% during dinner. Will endorse to night 
shift nurse.

## 2020-07-30 VITALS — SYSTOLIC BLOOD PRESSURE: 136 MMHG | DIASTOLIC BLOOD PRESSURE: 60 MMHG

## 2020-07-30 VITALS — SYSTOLIC BLOOD PRESSURE: 147 MMHG | DIASTOLIC BLOOD PRESSURE: 79 MMHG

## 2020-07-30 LAB
ALBUMIN SERPL ELPH-MCNC: 2.7 G/DL (ref 2.9–4.4)
ALBUMIN/GLOB SERPL: 0.8 {RATIO} (ref 0.7–1.7)
ALPHA1 GLOB SERPL ELPH-MCNC: 0.4 G/DL (ref 0–0.4)
ALPHA2 GLOB SERPL ELPH-MCNC: 0.8 G/DL (ref 0.4–1)
B-GLOBULIN SERPL ELPH-MCNC: 0.9 G/DL (ref 0.7–1.3)
BASOPHILS # BLD AUTO: 0 /CMM (ref 0–0.2)
BASOPHILS NFR BLD AUTO: 0.2 % (ref 0–2)
BUN SERPL-MCNC: 14 MG/DL (ref 7–18)
CALCIUM SERPL-MCNC: 10.8 MG/DL (ref 8.5–10.1)
CHLORIDE SERPL-SCNC: 112 MMOL/L (ref 98–107)
CO2 SERPL-SCNC: 29 MMOL/L (ref 21–32)
CREAT SERPL-MCNC: 1.3 MG/DL (ref 0.6–1.3)
EOSINOPHIL NFR BLD AUTO: 0.9 % (ref 0–6)
GAMMA GLOB SERPL ELPH-MCNC: 1.3 G/DL (ref 0.4–1.8)
GLOBULIN SER CALC-MCNC: 3.4 G/DL (ref 2.2–3.9)
GLUCOSE SERPL-MCNC: 96 MG/DL (ref 74–106)
HCT VFR BLD AUTO: 33 % (ref 39–51)
HGB BLD-MCNC: 11 G/DL (ref 13.5–17.5)
LYMPHOCYTES NFR BLD AUTO: 0.9 /CMM (ref 0.8–4.8)
LYMPHOCYTES NFR BLD AUTO: 11.4 % (ref 20–44)
MAGNESIUM SERPL-MCNC: 2 MG/DL (ref 1.8–2.4)
MCHC RBC AUTO-ENTMCNC: 33 G/DL (ref 31–36)
MCV RBC AUTO: 95 FL (ref 80–96)
MONOCYTES NFR BLD AUTO: 0.5 /CMM (ref 0.1–1.3)
MONOCYTES NFR BLD AUTO: 6.9 % (ref 2–12)
NEUTROPHILS # BLD AUTO: 6.3 /CMM (ref 1.8–8.9)
NEUTROPHILS NFR BLD AUTO: 80.6 % (ref 43–81)
PHOSPHATE SERPL-MCNC: 3.1 MG/DL (ref 2.5–4.9)
PLATELET # BLD AUTO: 258 /CMM (ref 150–450)
POTASSIUM SERPL-SCNC: 3.1 MMOL/L (ref 3.5–5.1)
RBC # BLD AUTO: 3.47 MIL/UL (ref 4.5–6)
SODIUM SERPL-SCNC: 150 MMOL/L (ref 136–145)
WBC NRBC COR # BLD AUTO: 7.8 K/UL (ref 4.3–11)

## 2020-07-30 RX ADMIN — POTASSIUM CHLORIDE SCH MEQ: 1500 TABLET, EXTENDED RELEASE ORAL at 12:06

## 2020-07-30 RX ADMIN — CLOTRIMAZOLE SCH APPLIC: 1 CREAM TOPICAL at 08:11

## 2020-07-30 RX ADMIN — GABAPENTIN SCH MG: 100 CAPSULE ORAL at 12:06

## 2020-07-30 RX ADMIN — SERTRALINE HYDROCHLORIDE SCH MG: 25 TABLET, FILM COATED ORAL at 12:07

## 2020-07-30 RX ADMIN — SODIUM CHLORIDE PRN MLS/HR: 4.5 INJECTION, SOLUTION INTRAVENOUS at 06:37

## 2020-07-30 RX ADMIN — Medication SCH ML: at 08:10

## 2020-07-30 RX ADMIN — GABAPENTIN SCH MG: 100 CAPSULE ORAL at 08:10

## 2020-07-30 RX ADMIN — POTASSIUM CHLORIDE SCH MEQ: 1500 TABLET, EXTENDED RELEASE ORAL at 11:14

## 2020-07-30 RX ADMIN — Medication SCH ML: at 12:07

## 2020-07-30 NOTE — NUR
RN ADMISSION NOTE: PATIENT IS A 88 Y/O MALE, DIRECT ADMIT FROM MS. BROUGHT INTO GPS ON A 
5150 HOLD FOR DTS/ GD. AT THIS TIME, PATIENT IS HAVING SI WITH NO PLAN, DENIES HI/VAH AT 
THIS TIME. PATIENT IS ALERT BUT VERY CONFUSED, UNABLE TO PROPERLY RESPOND TO QUESTIONS. 
PATIENT IS DEPRESSED, FEARFUL, WITHDRAWN BUT WILLING TO COOPERATE WITH PLAN OF CARE. PATIENT 
HAS AN OPEN WOUND ON SACRUM. WOUND CARE PICTURES ARE TAKEN AND PLACED IN CHART WITH WOUND 
CARE CONSULT, PT EVAL, AND DIETARY CONSULT TO ASSESS FOR MALNUTRITION. UNABLE TO UNDERSTAND 
WHAT PATIENT IS STATING AT THIS TIME. PATIENT IS MAKING INAUDIBLE SOUNDS. PATIENT HANDBOOK 
GIVEN WITH PATIENT'S RIGHT AND GUIDE TO PRESCRIPTIONS. VS TAKEN, CODE STATUS ORDERED, 
ALLERGIES DOCUMENTED. ASSESSMENTS COMPLETE. PATIENT TOO CONFUSED TO SIGN ADMISSION PACKET. 
VALUABLES TAKEN AND PLACED IN SAFE. DR MONTGOMERY AND KRISTIN, NP BOTH AWARE OF ADMISSION WITH 
ADMITTING ORDERS IN PLACE AND NOTIFIED TO COMPLETE MED RECON. SAFETY MEASURES IMPLEMENTED, 
BED IN LOWEST POSITION, LOCKED, SIDE RAILS UP, CALL LIGHT WITHIN REACH. WILL CONTINUE TO 
MONITOR PATIENT FOR SAFETY AND BEHAVIOR PER GPS PROTOCOL.

## 2020-07-30 NOTE — NUR
MS/RN OPENING NOTES



Received patient resting in bed, moved to room 307-2. A&O x 1, Welsh speaking, sitter at 
bedside. No complaints of pain/discomfort noted at this time. Breathing even and non-labored 
on room air, no SOB noted. No cardiac distress noted. IV access on RFA #22, patent and 
intact, and running 1/2NS @75 ml/hr. No infiltration/infection/bleeding noted on site. 
Condom catheter in place, draining yellow urine output well. Sensation from all peripheral 
extremities intact. Fall precautions maintained. Will continue current plan of care.

## 2020-07-30 NOTE — NUR
MS RN CLOSING NOTES

PATIENT SLEEPING IN BED. 1:1 SITTER PRESENT AT THE BEDSIDE FOR PATIENT SAFETY. A/OX 1-2. ON 
RA; NO S/S OF ACUTE RESPIRATORY DISTRESS; BREATHING IS EVEN AND UNLABORED. IV PRESENT ON FA, 
SIZE 22, INTACT & PATENT WITH 1/2 NS RUNNING AT 75 ML/HR. CONDOM CATH PRESENT AND DRAINING 
WELL. SAFETY MEASURES IN PLACE AND PATIENT'S NEEDS MET. BED LOCKED, ALARM ON, SIDE RAILS X3, 
CALL LIGHT WITHIN REACH. WILL ENDORSE TO DAY SHIFT RN PLAN OF CARE.

## 2020-07-30 NOTE — NUR
RN-CO: Notified Dr Orosco regarding this admission. She gave her admitting orders but 
stated no to Temazepam.Noted.

## 2020-07-30 NOTE — NUR
MS/RN DISCHARGE NOTES



Patient transferred safely to GPS in wheelchair along with belongings and hospitalization 
documentations. Patient remained stable, VSS, A&O x 1. No s/s of pain/discomfort noted. 
Breathing even and non-labored on RA, no SOB noted. No cardiac distress noted. IV access 
removed with catheter intact, placed clean dry dressing and put pressure. No 
bleeding/infiltration/infection noted on site. Photos of skin impairment taken, placed in 
chart. Sensation from all peripheral extremities intact. Explained all discharge 
instructions and plan of care to patient, despite being confused. Gave report to charge RN 
in GPS regarding patient's plan of care. She verbalized understanding.

## 2020-07-30 NOTE — NUR
GPS-RN NOTE: ANXIETY



PATIENT IS ANXIOUS AND RESTLESS. ADMINISTERED ATIVAN MG PO AS ORDERED. WILL CONTINUE TO 
MONITOR FOR SAFETY AND BEHAVIOR.

## 2020-07-31 NOTE — NUR
GROUP NOTE: Topic: Learning coping skills.  attempted to encourage to 
participate in group. Patient was asleep at this time and unable to participate.

## 2020-07-31 NOTE — NUR
Social Work Attestation Note:



I, Arlet VELAZQUEZ, attest to the accuracy of the psychosocial assessment done on this 
patient by Annie VELAZQUEZ on 07/24/2020. On the admissions for 07/24/2020, the [facility] 
[facility] for danger to self, due to patient 
making suicidal threats with a plan to shoot himself. Patient was depressed and admitted to 
persistent and intrusive suicidal thoughts every two days with a plan to shoot himself 
with a gun. On 07/27/20, patient was admitted to the medical inpatient unit for 
Hypercalcemia. Patient was medically cleared and transferred back to MHU on 07/30/20, now on 
a 5250 hold for danger to self and grave disability. Patient remains disorganized, 
withdrawn, unable to properly respond to questions. Patient remains depressed but currently 
denies a plan to harm himself at this time. Patient is accepted at [address] [address] ([phone]) and will be admitted for placement 
upon discharge. Patient has no supportive contacts at this time. SW will continue to work 
with the patient and MD to ensure a safe and proper discharge plan.

## 2020-07-31 NOTE — NUR
GPS/RN-NOTES

DNP ANGELINA KNOX MADE AWARE OF PT. POTASSIUM LEVEL OF 3.0 AND SODIUM LEVEL  TODAY. STILL 
AWAITING FOR CALL BACK.

## 2020-07-31 NOTE — NUR
GPS RN NOTES



RECEIVED PATIENT IN BED, ASLEEP EASILY AROUSED. BREATHING EVEN AND UNLABORED ON ROOM AIR. 
SHOWS NO SIGNS OF ACUTE RESPIRATORY DISTRESS, NO ACUTE PAIN. PT IS ALERT AND ORIENTED X1. 
CONFUSED AND ANXIOUS, ONLY SPEAKS Armenian.PT IS COMPLIANT WITH MEDICATIONS. SAFETY 
PRECAUTIONS IN PLACE. BED IN LOWEST POSITION, LOCKED, AND SIDE RAILS UP. WILL CONTINUE TO 
MONITOR.

## 2020-07-31 NOTE — NUR
GPS/RN-NOTES

RECEIVED PATIENT SLEEPING IN BED WITH BREATHING EVEN AND  NONLABORED EASILY AROUSE.CALM WITH 
CONFUSION ALERT TO NAME ONLY. NO ACUTE  DISTRESS NOTED.ALL NEEDS ATTENDED AND 
ANTICIPATED.WILL CONT. MONITORING FOR SAFETY AND BEHAVIOR.

## 2020-08-01 NOTE — NUR
GPS/RN-NOTES

NO STOOL COLLECTED TODAY. WILL ENDORSE TO INCOMING NURSE FOR COLLECTION AND CONTINUITY OF 
CARE.

## 2020-08-02 NOTE — NUR
MS RN OPENING NOTES



RECEIVED PATIENT FROM MORNING SHIFT, OBTUNDED OPENS EYES. BREATHING REGULAR AND UNLABORED ON 
OXYGEN AT 4L/MIN VIA FACE MASK. LEFT FOREARM G24 IV LINE INTACT AND PATENT, INFUSING WELL 
WITH NO BLEEDING OR S/S OF INFILTRATION NOTED. NO S/S OF PAIN/DISCOMFORT SEEN AT THIS TIME. 
BILATERAL SOFT WRIST RESTRAINTS ON, SKIN ASSESSMENT DONE. BED LOW AND LOCKED ON SEMI FOWLERS 
POSITION. CALL LIGHT IN REACH. WILL CONTINUE TO MONITOR.

## 2020-08-02 NOTE — NUR
RN NOTES

 PATIENT IN THE BED REFUSED DINNER, PATIENT ASPIRATION PRECAUTION, V/S TAKEN /56, 
P-101, R-21,T-98, 02-4L,100 MASK, ASSIST TURN AND REPOSTION Q 2 HR, DUNCAN CATHETER DRAINING 
TEA COLOR OUTPUT,  KEEP HOB ELEVATED FOR ASPIRATION PRECAUTION. TITRATED O2-3L, IV ACCESS ON 
LEFT FA INTACT INFUSING D5W  ML/HR.TRIGGERED WOUND CONSULT.  ENDORSED ONCOMING NURSE 
FOLLOW PLAN OF CARE.

## 2020-08-02 NOTE — NUR
RN NOTES

 ABG RESULT NO CRITICAL,AND COVED RESULT WAS NEGATIVE. NOTIFIED HOSPITALIST, NO NEW ORDERS.

## 2020-08-02 NOTE — NUR
MS RN NOTES



BODY ASSESSMENT DONE, SEEN WITH SACRAL REDNESS. PHOTO TAKEN, ATTACHED TO CHART. MEPILEX 
APPLIED. WOUND CONSULT ORDERED BY MORNING NURSE. REPOSITIONING EVERY 2HRS AND AS NEEDED.

## 2020-08-02 NOTE — NUR
RN NOTE- PT LETHARGIC ORIENTED TO SELF WHEN NAME IS SPOKEN DIFFICULTY COMMMUNICATING 
CRITICAL NA LEVEL 160. MD AWARE PENDING ORDERS. POOR INTAKE LOW MOBILITY NEEDS ATTENDED 
REPOSTIONED FLOATING HEELS ETC

## 2020-08-02 NOTE — NUR
RN NOTES

 PATIENT HR-122, R-27, O2-83/ 84, T-99.1,  BP 94/68. PATIENT PATIENT FULL CODE , CALLED AND 
NOTIFIED ANGELINA KNOX .

## 2020-08-02 NOTE — NUR
MS RN NOTES



RELAYED TROPONIN RESULT TO  LEVEL INCREASED FROM 0.090 TO 0.104 WITH NO NEW ORDERS 
RECEIVED.

## 2020-08-02 NOTE — NUR
RN NOTES

 SEEN PATIENT BY HOSPITALIST, GET TO ORDER STAT ABG,  COVED TESTING, BLOOD CULTURE, DUNCAN 
CATHETER, RESTRAIN, AND STAT CHEST X-RAY, ORDER TAKEN AND CARRIED OUT.

## 2020-08-02 NOTE — NUR
RN-CO: DR ANGELINA KNOX ORDERED TO DISCHARGE PATIENT TO MEDICAL FLOOR DUE TO HIGH NA LEVEL 165 
AND FURTHER EVALUATION. DR MONTGOMERY MADE AWARE AND SHE ORDERED TO DISCONTINUE HOLD AND AGREED 
TO DR KNOX. NO FAMILY MEMBERS TO NOTIFY. PATIENT IS ON [id number] @ 100 ML /HR.

## 2020-08-02 NOTE — NUR
RN NOTE- DT ANGELINA KNOX CALLED AND ORDERED D5W 1000 ML BAG AT 100ML/HR. IV STARTED LT 
POSTERIOR WRIST. IV FLUIDS UP AND RUNNING. RN AT BEDSIDE TO MONITOR AND ASSIST. AM LABS 
ORDERED PER DR ANGELINA KNOX

## 2020-08-03 NOTE — NUR
MS RN NOTES



ROCEPHIN AND VANCOMYCIN IV GIVEN, MONITORED FOR DRUG ADVERSE REACTIONS. ASSISTED ON EATING 
PUREED PEACH,  WELL TOLERATED. MAINTAINED ON ASPIRATION PRECAUTIONS.

## 2020-08-03 NOTE — NUR
MS RN CLOSING NOTES

Patient resting in bed. Non-verbal, responsive to name and touch. VS stable with no acute 
distress. Breathing even and unlabored on 4LPM via Mask with no respiratory distress. Denies 
pain. No signs and symptoms of pain. 24g PIV on left forearm clean, intact, patent and 
flushing well with D5W infusing at 100ml/hr. Barrientos Cath in place and patent with clear 
yellow output noted. Safety precautions in place. Bed locked and set to lowest position with 
side rails x 2 up. All needs rendered at this time. Call light within reach. Will endorse 
plan of care to oncoming shift.

## 2020-08-03 NOTE — NUR
MS RN OPENING NOTES



RECEIVED PATIENT FROM MORNING SHIFT, ALERT AND ORIENTED X 1. BREATHING REGULAR AND UNLABORED 
ON OXYGEN AT 3L/MIN VIA FACE MASK. LEFT FOREARM G24 IV LINE INTACT AND PATENT, INFUSING WELL 
WITH NO BLEEDING OR S/S OF INFILTRATION NOTED. NO S/S OF PAIN/DISCOMFORT SEEN AT THIS TIME. 
DVT PUMP ON. BED LOW AND LOCKED ON SEMI FOWLERS POSITION. CALL LIGHT IN REACH. WILL CONTINUE 
TO MONITOR.

## 2020-08-03 NOTE — NUR
MS RN OPENING NOTES

Received Patient resting in bed. Non-verbal, responsive to name and touch. VS stable with no 
acute distress. Breathing even and unlabored on room air with no respiratory distress. 
Denies pain. No signs and symptoms of pain. 24g PIV on left forearm clean, intact, patent 
and flushing well with D5W infusing at 100ml/hr. Barrientos Cath in place and patent with clear 
yellow output noted. Safety precautions in place. Bed locked and set to lowest position with 
side rails x 2 up. All needs rendered at this time. Call light within reach. Will continue 
to monitor. 

-------------------------------------------------------------------------------

Addendum: 08/03/20 at 0902 by ESTEFANY TERRELL RN

-------------------------------------------------------------------------------

***Breathing even and unlabored on 4LPM via Mask with no respiratory distress***

## 2020-08-03 NOTE — NUR
MS RN NOTES

All Patients belongings, including dentures, clothes and cane, in cabinet next to sink.

## 2020-08-03 NOTE — NUR
Discharge Note:



Patient was discharged from [facility] and transferred to [facility] on 08/02/20 due to High NA levels 
and further evaluation. Dr. Orosco discontinued the patient's psychiatric hold. Patient was 
initially from home where he lived alone, and [place] [address] ([phone]) where he would be admitted 
for placement. [facility] ([phone][facility] Britton MORRIS was keeping 
updated regarding patients discharge plan. Patient's son, Tosha Sanchez ([phone]) is 
unreachable.

## 2020-08-03 NOTE — NUR
WOUND CARE CONSULT:SEEN PATIENT AT BEDSIDE,PATIENT PRESENTS SCARRING ON SACRAL ,PRESENT ON 
ADMISSION,RECOMMENDATION MADE FOR SKIN PROTECTION AND CARE ,DISCUSSED WITH NURSING 
STAFF,ISOFLEX FRANCESCA BED IN USE, TAMARA SCORE IS 11,PATIENT HAS DUNCAN CATHETER AT THIS TIME,MD 
IN AGREEMENT WITH PLAN OF CARE , WILL SEE PRN

## 2020-08-03 NOTE — NUR
MS RN CLOSING NOTES



PATIENT IN BED, ALERT AND ORIENTED X 1 MUMBLES. AFEBRILE WITH NO S/S OF DISTRESS OBSERVED. 
LEFT FOREARM G24 IV LINE PATENT AND INFUSING WELL. NO S/S OF PAIN/DISCOMFORT SEEN AT THIS 
TIME. BILATERAL SOFT WRIST RESTRAINTS DISCONTINUED. BED LOW AND LOCKED ON SEMI FOWLERS 
POSITION. CALL LIGHT IN REACH. WILL ENDORSE TO MORNING SHIFT FOR SHENG.

## 2020-08-04 NOTE — NUR
MS RN NOTE:



PATIENT RESTING IN BED, NO ACUTE DISTRESS NOTED. BREATHING EVEN AND UNLABORED, NO SOB NOTED. 
IV TO LFA IN PLACE, INFUSING D5W AT 100ML/HR. DUNCAN CATHETER IN PLACE, DRAINING CLEAR YELLOW 
URINE. HOB ELEVATED. BED LOCKED AND IN LOWEST POSITION, CALL LIGHT IN REACH. WILL CONTINUE 
TO MONITOR.

## 2020-08-04 NOTE — NUR
MS RN CLOSING NOTES



PATIENT IN BED, ALERT AND ORIENTED X 1 MUMBLES. AFEBRILE WITH NO S/S OF DISTRESS OBSERVED. 
LEFT FOREARM G24 IV LINE PATENT AND INFUSING WELL. NO S/S OF PAIN/DISCOMFORT SEEN AT THIS 
TIME. DUNCAN CATH INTACT WITH CLEAR YELLOW URINE 700cc OUTPUT. BED LOW AND LOCKED ON SEMI 
FOWLERS POSITION. CALL LIGHT IN REACH. WILL ENDORSE TO MORNING SHIFT FOR SHENG.

## 2020-08-04 NOTE — NUR
MS RN OPENING NOTES

Received Patient resting in bed. Non-verbal, responsive to name and touch. VS stable with no 
acute distress. Breathing even and unlabored on 3LPM via Mask with no respiratory distress. 
Denies pain. No signs and symptoms of pain. 24g PIV on left forearm intact, patent and 
flushing well with D5W infusing at 100ml/hr. Barrientos Cath in place and patent with clear 
yellow output noted. Safety precautions in place. Bed locked and set to lowest position with 
side rails x 2 up. All needs rendered at this time. Call light within reach. Will continue 
to monitor.

## 2020-08-04 NOTE — NUR
MS RN CLOSING NOTES

Patient resting in bed. A/O x 1. VS stable with no acute distress. Breathing even and 
unlabored on 3LPM via Mask with no respiratory distress. Denies pain. No signs and symptoms 
of pain. 24g PIV on left forearm clean, intact, patent and flushing well with D5W infusing 
at 100ml/hr. Barrientos Cath in place and patent with clear yellow output noted. Safety 
precautions in place. Bed locked and set to lowest position with side rails x 2 up. All 
needs rendered at this time. Call light within reach. Will endorse plan of care to oncoming 
shift.

## 2020-08-05 NOTE — NUR
PT TOLERATED O2 AT 1L/MIN VIA NC. O2 SAT 95%. WITH OCCASIONAL MOIST COUGH SPITTING OUT SMALL 
THICK YELLOWISH GREEN SPUTUM. STILL AWAITING TRANSFER TO SNF.PT DENIES ANY DISTRESS AND HAS 
GOOD APPETITE WHEN FED DURING MEALS. WILL CONTINUE TO MONITOR.TURNED EVERY TWO HRS. WILL BE 
TRANSFERRED TO ROOM 325-2 AFTER EATING HIS DINNER.

## 2020-08-05 NOTE — NUR
MS RN NOTE:



PATIENT RESTING IN BED, NO ACUTE DISTRESS NOTED. BREATHING EVEN AND UNLABORED, NO SOB NOTED. 
IV TO LFA IN PLACE, INFUSING D5W AT 100ML/HR. DUNCAN CATHETER IN PLACE, DRAINED 900ML OF 
CLEAR YELLOW URINE. HOB ELEVATED. BED LOCKED AND IN LOWEST POSITION, CALL LIGHT IN REACH. 
WILL ENDORSE TO DAY NURSE TO CONTINUE WITH PLAN OF CARE.

## 2020-08-05 NOTE — NUR
WHILE FEEDING PT BREAKFAST,TRIED PUTTING PT ON O2 CANNULA AT 2L/MIN WITH O2 SAT OF 95-96%. 
NO SOB NOTED. PT DENIES SOB AS WELL.PT % BREAKFAST WITH ASPIRATION PRECAUTIONS.ON 
NECTAR THICKENED LIQUIDS. WILL CONTINUE TO MONITOR.

## 2020-08-05 NOTE — NUR
MS RN NOTE:



PATIENT SLEEPING IN BED, NO ACUTE DISTRESS NOTED. BREATHING EVEN AND UNLABORED, NO SOB 
NOTED. IV TO LFA IN PLACE, INFUSING D5W AT 100ML/HR. DUNCAN CATHETER IN PLACE, DRAINING CLEAR 
YELLOW URINE. HOB ELEVATED. BED LOCKED AND IN LOWEST POSITION, CALL LIGHT IN REACH. WILL 
CONTINUE TO MONITOR.

## 2020-08-05 NOTE — NUR
RN medsurg opening notes

Received Pt from morning nurse. Pt is resting in bed comfortably. Pt is alert and 
orientedX1. Pt speaks Latvian and able to make needs known. Respiration is normal. No SOB. No 
S/S of distress noted. IV sites LFA# 24 is clean, intact and infusing well D5W@ 100ml/hr. 
Barrientos cath is intact, patent and draining yellow urine. Safety precautions is maintained. 
Bed at low position, brakes locked, HOB elevated, side rails upX3 and call light is within 
reach. Will continue to monitor.

## 2020-08-06 NOTE — NUR
MS/RN OPENING NOTES

RECEIVED PATIENT IN BED,IN BED, ABLE TO OPEN EYES BUT , CAN ACKNOWLEDGE NURSE, APPEAR WEAK 
BUT HAS STRONG ARMS THAT CAN MOVE, WITH DUNCAN CATHETER DRAINING LIGHT YELLOWISH COLORED 
URINE, ON OXYGEN AT 1 LITER FOR COMFORT MEASURES, RESPIRATIONS EVENA ND UNLABORED, BED 
LOCKED, CALL LIGHT WITHIN REACH, WILL MONITOR. PATIENT ON AS NEEDED RESTRAINT FOR SAFETY, 
REMOVE AND CHECK CIRCULATION .

## 2020-08-06 NOTE — NUR
RN medsurg notes

Pt keep trying to pull an IV sites and gutierrez several times. Informed Pt not to remove IV and 
gutierrez. Pt is still trying to remove Iv and gutierrez. Pt is alert and orientedX1. Informed and 
notified Pacheco Tompkins NP. NP ordered R soft wrist restraint. Charge nurse is aware and 
informed. Will continue to monitor.

## 2020-08-06 NOTE — NUR
MS/RN NOTES

RECEIVED PATIENT ON BED. PATIENT IN NO APPARENT RESPIRATORY DISTRESS NOTED. NO SIGN AND 
SYMPTOM OF PAIN NOTED. WILL CONTINUE TO MONITOR.

## 2020-08-06 NOTE — NUR
MS/RN OPENING NOTES

RECEIVED PATIENT IN BED, ABLE TO VERBALIZE NEEDS, HAD PAIN GIVEN BY AM RN AT THE START OF 
SHIFT NORCO  PO. PATIENT ON CONTINOUS BLADDER IRRIGATION AND FIRST BAG FOR THE SHIFT WAS 
STARTED AT 1900, AND EMPTIED OUTPUT NOW AT 2000 ML PINKISH COLOR, PATIENT HAS SOME 
DISCOMFORT, CHECK FOR ANY NEEDS, WILL CONTINUE, IV SITE ON  RIGHT AC RUNNING WITH IVF AT A 
RATE OF 75 ML/HR. BED LOCKED, CALL LIGTHS WITHIN REACH, ON OXYGEN  AT 1 LITER, RESPIRATIONS 
EVEN AND UNLABORED, WILL MONITOR. CNA AT BEDSIDE CHECKING PATIENT VITAL SIGNS. BED 
LOCKED,CALL LIGHTS WITHIN REACH. PATIENT REQUIRE EXTENSIVE ASSISTANT.

-------------------------------------------------------------------------------

Addendum: 08/06/20 at 1949 by CHRISTIAN ROBERSON RN

-------------------------------------------------------------------------------

PLS DISREGARD FOR ANOTHER PATIENT.

## 2020-08-06 NOTE — NUR
RN medsurg closing notes

Pt is resting in bed comfortably. Pt is alert and orientedX1. Pt speaks Yoruba and able to 
make needs known. Respiration is normal. No SOB. No S/S of distress noted. VS is stable. 
Afebrile. Routine meds were given as ordered. IV sites LFA# 24 is clean, intact and infusing 
well D5W@ 100ml/hr. Barrientos cath is intact, patent and draining yellow urine. R soft wrist 
restrain is intact, skin is warm to touch and circulation is check q 2 hr. Kept Pt clean, 
dry and comfortable. All needs met and attended. Safety precautions is maintained. Bed at 
low position, brakes locked, HOB elevated, side rails upX3 and call light is within reach. 
Will endorse to morning nurse for SHENG.

## 2020-08-06 NOTE — NUR
325

MS/RN CLOSING NOTES

PATIENT IS ON BED. ALERT AND ORIENTED X1. PATIENT IN NO APPARENT RESPIRATORY DISTRESS NOTED. 
PATIENT IS NO SIGN AND SYMPTOM OF PAIN AT THIS TIME. IV ACCESS AT LEFT FOREARM #24 G WITH IV 
FLUID OF D5W 1L AT 100ML/HR ON AND INFUSING WELL. SEEN AND EXAMINED BY MD WITH ORDERS MADE 
CARRIED OUT. ALL DUE MEDICATION WAS GIVEN. CHECKED AND TURNED PATIENT EVERY 2 HOURS. SAFETY 
MEASURE IN PLACED. BED IN LOWEST POSITION AND LOCKED. SIDE RAILS UP X2. CALL LIGHT WITHIN 
REACH. WILL ENDORSED TO NIGHT SHIFT FOR SHENG.

## 2020-08-07 NOTE — NUR
Discharge patient:



Patient discharge order carried out. Patient is still stable. Patient on 2L nasal canula. 
Breathing well. No SOB. Breathing even and unlabored. No signs of distress. Gave report to 
EMT from SRINIVAS[facility] Nickerson. DC IV access. DC Barrientos Catheter. Patient left the unit at 2015

## 2020-08-07 NOTE — NUR
MS/RN OPENING NOTES

RECEIVED PATIENT ON BED. NO APPARENT RESPIRATORY DISTRESS NOTED. NO SIGN AND SYMPTOM PAIN AT 
THIS TIME. WILL CONTINUE TO MONITOR.

## 2020-08-07 NOTE — NUR
MS/RN CLOSING NOTES

PATIENT IS ON BED ALERT AND ORIENTED X1. NO SIGN AND SYMPTOM OF PAIN. NO APPARENT 
RESPIRATORY DISTRESS NOTED. IV ACCESS AT LEFT FOREARM # 24G WITH IV FLUID OF D5W 1 L  
ML/HR ON AND INFUSING WELL. SEEN AND EXAMINED BY MD WITH ORDERS  MADE AND CARRIED OUT. ALL 
DUE MEDICATION WAS GIVEN. CHECKED AND  TURNED PATIENT EVERY 2 HOURS. SAFETY PRECAUTION IN 
PLACED. BED IN LOWEST POSITION AND LOCKED, SIDE RAILS UP X2. CALL LIGHT WITH IN REACH. 
PATIENT IS FOR DISCHARGED TODAY. PATIENT IS GOING TO [facility] GIVE REPORT BRANNON RN. 
WILL ENDORSED TO NIGHT SHIFT.

## 2020-08-07 NOTE — NUR
325-1MS/RN NOTES

PATIENT INCOHERENT AND REQUIRE ASSITANCE FOR ALL NEEDS, OBSERVE SCRATCHING FACE WITH HIS 
TONY THAT CAUSE IRRITATION AN DSOME BLEEDING , ALSO PULLING OUT TUBINGS, FOR SAFETY WITH 
SOFT RESTRAIN AS NEEDED, BED LOCKED, CALL LIGHTS WITHIN REACH, DUNCAN CATHETER WITH CLEAR 
COLORED URINE, IV SITE INFUSING WITH D5 WATER, MONITORED AND WILL ENDORSE TO AM RN FOR SHENG.

## 2020-08-17 NOTE — NUR
RN NOTE



NOTED ORDER FOR PICC LINE. HOWEVER, PICC LINE NURSE UNAVAILABLE UNTIL TOMORROW AM. INSERTED 
PERIPHERAL IV LINE G20 AT LEFT ANTECUBITAL BY CHARGE RN, FLUSHING WELL, PATENT NO 
INFILTRATION NOTED. STARTED PATIENT ON LEVOPHED PER PROTOCOL AS ORDERED. BP 83/38, P 66. 
WILL CONTINUE TO MONITOR AND TITRATE TO KEEP SBP >90.

## 2020-08-17 NOTE — NUR
RN NOTES



RECEIVED PATIENT FROM ER. PATIENT TRANSFERRED  TO BED. ATTACHED TO THE MONITOR. PATIENT 
CLEANED AND MADE COMFORTABLE IN BED. SKIN ASSESSMENT DONE, PICTURES TAKEN AND FILED ON THE 
CHART. HOB ELEVATED FOR SAP. SAFETY MEASURES OBSERVED AND PUT IN PLACE. CALL LIGHT PLACED 
WITHIN REACH, WILL CONTINUE TO MONITOR PATIENT ACCORDINGLY.





> PATIENT SEEN ANND EXAMINED BY DR. LOPEZ. ORDERS MADE. ORDER NOTED AND CARRIED OUT. COVID 
SWAB DONE, SENT TO LAB



>PAGED DR. BASSETT FOR ADMISSION ORDERS

## 2020-08-17 NOTE — NUR
RN NOTE



NOTED TEMP 100.6. COOLING MEASURES PROVIDED. PRN TYLENOL 650 MG ADMINISTERED AS ORDERED. 
WILL CONTINUE TO MONITOR TEMP.

## 2020-08-17 NOTE — NUR
CRITICAL LAB VALUE



TELEPHONE CALL FROM ISRAEL FROM LAB, STATED PATIENT'S TROPONIN LEVEL IS 0.451. DR FOSTER 
NOTIFIED, STATED IF BP KEEPS DROPPING, MAY START LEVOPHED PER PROTOCOL.

## 2020-08-17 NOTE — NUR
ICU RN NOTE



RECEIVED PT IN BED RESTING COMFORTABLY. LETHARGIC, OPENS EYES SPONTANEOUSLY, IN NO S/SX OF 
ACUTE DISTRESS AT THIS TIME. PATIENT'S BREATHING IS EVEN AND UNLABORED WITH EQUAL RISE AND 
FALL OF THE CHEST. PATIENT IS ON 2 L OF OXYGEN VIA NC, TOLERATING WELL, SATURATING AT 96%. 
PATIENT IS SINUS RHYTHM ON CARDIAC MONITOR WITH HR AT 67. NOTED IV SITE ON RAC G20 D5 1/2 NS 
 ML/HR; PATENT AND FLUSHING WELL,NO S/S OF INFECTION OR INFILTRATION. NOTED SCAB AND 
REDNESS AT SACRUM. DUNCAN CATH CONNECTED TO URINE BAG IN PLACE WITH CLEAR, FABIO OUTPUT 
NOTED. SAFETY MEASURES IMPLEMENTED PER PROTOCOL. PATIENT BED ALARM IS ON. HEAD OF BED 
ELEVATED. BED IS LOCKED,  IN LOWEST POSITION AND SIDE RAILS UP. CALL LIGHT WITHIN REACH OF 
THE PATIENT. WILL CONTINUE TO MONITOR AND REASSESS FOR ANY CHANGES.

-------------------------------------------------------------------------------

Addendum: 08/17/20 at 2106 by DARION CANNON RN

-------------------------------------------------------------------------------

NOTED CODE STATUS ORDERED AS FULL CODE. NO FAMILY CONTACT NUMBER FOUND ON FILE. UNABLE TO 
VERIFY CODE STATUS WITH FAMILY.

## 2020-08-17 NOTE — NUR
CHRISTINA Lopez FROM [facility] C/O LOW BP AROUND 50S AND ALTERED MENTAL STATUS. PT IS 
AAOX0, NOT IN RESPIRATORY DISTRESS, HOOKED TO CARDIAC MONITOR, KEPT RESTED AND 
COMFORTABLE. WILL CONTINUE TO MONITOR.

## 2020-08-18 NOTE — NUR
RN NOTES



RECEIVED PATIENT  ASLEEP ON BED.  BREATHING EVEN AND UNLABORED. AFEBRILE.  RESPONSIVE TO 
TOUCH. , NO SOB ON O2 2LPM VIA NC SATURATION 100% SB HR 50'S ON TELE MONITOR.  IV SITE ON 
RAC , LAC, AND RIGHT ARM PICC LINE   WITH LEVOPHED AND D51/2 NS  TITRATED AS ORDERED. 
TEMPERATURE 96.2 VIA RECTAL TEMP. WARMER  BLANKET KEPT IN PLACED.  F/C DRAINED WITH YELLOW 
COLOR URINE.  KEPT PT CLEAN AND DRY. TURN AND REPOSITION  Q2H AND PRN. KEPT PT CLEAN AND 
DRY. WILL CONTINUE TO MONITOR.

## 2020-08-18 NOTE — NUR
WOUND CARE CONSULT: REVIEWED CHART, NURSING DOCUMENTATION AND PHOTOS WHICH INDICATE LESION 
TO LEFT CORNER OF MOUTH, SACRAL INTACT DEEP TISSUE INJURY WITH SURROUNDING SCARRING, SCROTAL 
SKIN IRRITATION, ALL PRESENT ON ADMISSION. RECOMMENDATIONS MADE FOR WOUND CARE AND SKIN 
PROTECTION. DEFER TO MD FOR MOUTH LESION. RN TO DISCUSS WITH [facility]. [facility]  MD IN AGREEMENT WITH PLAN OF CARE.

## 2020-08-18 NOTE — NUR
CRITICAL LAB VALUE



TELEPHONE CALL RECEIVED FROM ISRAEL OF LAB, RELAYED CURRENT SODIUM LEVEL  FROM PREVIOUS 
163 WHICH APPEARS TRENDING DOWN AS EXPECTED. PROTOCOL WAS FOLLOWED. CHARGE RN MADE AWARE.

## 2020-08-18 NOTE — NUR
RN CLOSING NOTE: PATIENT REMAINS IN BED. NO SIGNS OF ACUTE DISTRESS NOTED AT THIS TIME. 
CONTINUING WARMING INTERVENTIONS. PATIENT STILL ON LEVOPHED FOR BP MAINTENANCE. SB IN THE 
50S NOTED ON THE BEDSIDE MONITOR. SAFETY MEASURES IMPLEMENTED, BED IN LOWEST POSITION, 
LOCKED, SIDE RAILS UP, CALL LIGHT WITHIN REACH. WILL ENDORSE TO ONCOMING SHIFT RN FOR 
CONTINUITY OF CARE.

## 2020-08-18 NOTE — NUR
LAURA GOMEZ, PATIENT'S TEMP CURRENTLY 96.5, WILL CONTINUE TO MONITOR PATIENT. 

-------------------------------------------------------------------------------

Addendum: 08/18/20 at 1032 by LAKHWINDER ZEPEDA RN

-------------------------------------------------------------------------------

TEMP IS NOW 97.2, WILL CONTINUE TO MONITOR

-------------------------------------------------------------------------------

Addendum: 08/18/20 at 1134 by LAKHWINDER ZEPEDA RN

-------------------------------------------------------------------------------

TEMP IS NOW 98.4, WILL CONTINUE TO MONITOR

## 2020-08-18 NOTE — NUR
reached out to Patient's  via [facility] [employer] 
[phone]. This SW spoke to ARISTIDES Celestin. Social Workers name is Clifford Givens. ARISTIDES Celestin 
informed this SW that she was not able to provide Clifford Givens's contact information as he is 
currently using a personal cell. This SW explained that we need more information regarding 
this patient from the SW Clifford Givens. ARISTIDES Celestin informed this SW that Clifford Givens will reach out in 
2-3 days to this SW at [phone]. 



ARISTIDES Celestin was able to provide this patient's caregiver information. The patient's caregiver 
is Umang Jha. This SW contacted Umang Jha at [phone] (cell) and [phone] 
(home). ARISTIDES was not able to speak to Umang Jha and left a voicemail. ARISTIDES Celestin from 
[facility] Support [facility] also provided an email [email or web address]. 



ARISTIDES to follow up with the caregiver again tomorrow 8/19. ARISTIDES remains available regarding all 
needs to this patient.

## 2020-08-18 NOTE — NUR
RN NOTE



TELEPHONE CALL RECEIVED FROM ISRAEL OF LAB, RELAYED CURRENT TROPONIN OF 0.415 WHICH APPEARS 
TRENDING DOWN AS EXPECTED. PROTOCOL WAS FOLLOWED. PATIENT NOW ON HEPARIN THERAPY. CHARGE RN 
MADE AWARE.

## 2020-08-18 NOTE — NUR
ST CAME TO ISACAL PATIENT, REQUESTED NPO STATUS D/T LETHARGY, WILL COME BACK LATER ON TODAY TO 
RE-EVALUATE

-------------------------------------------------------------------------------

Addendum: 08/18/20 at 1038 by LAKHWINDER ZEPEDA RN

-------------------------------------------------------------------------------

PER ST, SHE WILL EVALUATE PATIENT TOMORROW. PATIENT IS NOT ALERT AND ABLE TO FOLLOW 
DIRECTIONS AT THIS TIME. PATIENT WILL REMAIN NPO STATUS D/T R/F ASPIRATION

## 2020-08-18 NOTE — NUR
LAURA JASON ORDERED FROM [employer] FOR PATIENT'S TEMP OF 95.6. PATIENT IS LETHARGIC, 
BREAKFAST HELD, WILL ORDER [facility] FOR BASELINE AND SAFETY.

## 2020-08-18 NOTE — NUR
SPOKE TO FRANKLYN WYMAN (PATIENT'S ) IN REGARDS TO PATIENT'S ADMISSION. INFORMED SOCIAL 
WORKER THAT PATIENT IS CURRENTLY IN THE ICU AND IS LETHARGIC, UNABLE TO SPEAK AT THIS TIME. 
SW STATED THAT THE PATIENT HAS A CAREGIVER BUT REFUSES TO GIVE THE NAME OF THE CAREGIVER. 
PATIENT HAS NO FAMILY CONTACT INFORMATION. WILL CONTACT SS TO GIVE THEM INFORMATION ABOUT 
PATIENT'S SW/CGV TO FIND OUT MORE INFORMATION. 





FRANKLYN WYMAN () [phone]

CAREGIVER (NAME UNKNOWN) [phone]

-------------------------------------------------------------------------------

Addendum: 08/18/20 at 1121 by LAKHWINDER ZEPEDA RN

-------------------------------------------------------------------------------

INFORMED [facility]

## 2020-08-19 NOTE — NUR
RN NOTES



PATIENT  TEMPERATURE  WENT UP TO 99.2  VIA  RECTAL  CHECK ON AXILLA  MANUALLY  PATIENT  TEMP 
99. DEG FHARENHEIGHT.  WARM BLANKET REMOVED.  WILL CONTINUE TO MONITOR.

## 2020-08-19 NOTE — NUR
ICU RN OPENING NOTES:



Rec'd pt awake in bed, A&Ox1. On 2LPM NC tolerating well, no SOB or respiratory distress 
noted. SB/SR on tele monitor. IV sites on RAC #20, LAC #20 and DAVID PICC line. Levophed 
infusing at 0.2mcg/kg/min to maintain MAP >65. Will titrate per protocol. D5 1/2NS infusing 
at 75ml/hr. On BSW restraints. Will remove and check circulation per protocol. On bare 
hugger for low temp. Barrientos cath in place, patent and draining urine. Currently NPO status. 
Safety measures in place. Will continue to monitor.

-------------------------------------------------------------------------------

Addendum: 08/19/20 at 2059 by AXEL FULTON RN

-------------------------------------------------------------------------------

Levo order to maintain SBP >90.

## 2020-08-19 NOTE — NUR
RN NOTES



PATIENT  ASLEEP WELL  MORE  ACTIVE AND COMMUNICATIVE AT THIS TIME,  ANSWER  SOME QUESTION 
AND   COMMAND.  SMILE ON  NURSES.  STILL WITH EPISODE OF HYPOTENSION.  LEVOPHED  RESTARTED  
TITRATED AS ORDERED. DUNCAN CATH DRAINED VIA GRAVITY.  IV SITE REMAINED INTACT AND PATENT IVF 
ONGOING AND LEVOPHES.  KEPT PT CLEAN AND  DRY. ORAL CARE PROVIDED FREQUENTLY. TURNED AN 
REPOSITIONED Q2H AND PRN

## 2020-08-19 NOTE — NUR
RN OPENING NOTE: RECEIVED PATIENT IN BED THIS MORNING. PATIENT IS CURRENTLY SLEEPING AT THIS 
TIME BUT SEEMS A BIT LESS LETHARGIC THAN YESTERDAY MORNING. RUNNING ON O2 2L/MIN VIA NC, 
SATING WELL. NO SIGNS OF ACUTE DISTRESS NOTED AT THIS TIME. PATIENT IS ON LEVO TO KEEP 
REGULATE BP. SINUS BEBE IN THE 40S-50S ON BEDSIDE MONITOR. WOUND CARE PER ORDERS. PATIENT 
HAS DUNCAN, DRAINING URINE. #20 LAC, #20 RAC, DAVID PICC LINE, C/D/I, FLUSHING WELL, NO SIGNS 
OF COMPLICATIONS NOTED. SAFETY MEASURES IMPLEMENTED, BED IN LOWEST POSITION, LOCKED, SIDE 
RAILS UP, CALL LIGHT WITHIN REACH. WILL CONTINUE TO MONITOR PATIENT FOR CHANGES.

## 2020-08-20 NOTE — NUR
ICU RN OPENING NOTES:



Rec'd pt resting in bed, A&Ox1. On 2LPM NC tolerating well. No SOB or respiratory distress 
noted. SR/SB on tele monitor. IV site on RAC #20 and DAVID PICC line patent and flushed. 
Dressings c/d/i. D5 1/2NS infusing at 75ml/hr. On BSW restraints. Will remove and check 
circulation per protocol. Currently NPO. Safety measures in place. Will continue to monitor. 

-------------------------------------------------------------------------------

Addendum: 08/20/20 at 1937 by AXEL FULTON RN

-------------------------------------------------------------------------------

Barrientos cath in place patent and draining urine via gravity

## 2020-08-20 NOTE — NUR
received pt from night shift, a/o x1, does not follows commands, SR, on 2L 02 sat well, NPO 
failed swallow evaluation 8/19, f/c OK output, 1BM, restraints on, receiving levo at 
0.02mcg, v/s stable, no pain, pt turned and repositioned.

## 2020-08-20 NOTE — NUR
ICU RN NOTE:



Pt noted w/ LAC #20 leaking and dislodged. Pulled IV out and applied pressure. Will continue 
to monitor.

## 2020-08-20 NOTE — NUR
ICU RN CLOSING NOTES:



Pt remains in stable condition. No acute changes noted throughout shift. On 2LPM NC 
tolerating well. No SOB or respiratory distress noted throughout shift. BSW restraints 
remain in place. Levo infusing at 0.02 mcg/kg/min. Titrated to maintain SBP >90. D5 1/2 NS 
infusing at 75ml/hr. Barrientos in place patent and draining urine. All meds administered as 
ordered. Kept clean/dry. Safety measures in place. Will endorse to AM nurse for SHENG.

## 2020-08-21 NOTE — NUR
JAIRO ICU RN NOTES

PT IS STABLE TO TRANSFER WITH ACLS PROTOCOL. PT WAS TRANSFERRED SAFELY TO [facility]- TO [place] FOR 
CONTINUE TO SHENG

## 2020-08-21 NOTE — NUR
ICU/JAIRO RN OPENING NOTES:



 Pt IS resting in bed, A&Ox1. On 2LPM NC tolerating well. No SOB or respiratory distress 
noted. SR/SB on tele monitor. IV site on RAC #20 and DAVID PICC line patent and flushed well 
and intact. Dressings c/d/i. D5 1000ml  infusing at 75ml/hr. On BSW restraints. Will remove 
and check circulation per protocol. Currently NPO. Safety measurements are implemented and 
rails are up x2. call light within reach.Will continue to monitor.

## 2020-08-21 NOTE — NUR
ICU RN CLOSING NOTES:



Pt remained stable throughout shift. No SOB or respiratory distress noted throughout shift. 
No acute changes noted throughout shift. SR/SB w/ BBB on tele monitor. IV lines patent and 
flushed. BSW restraints remain in place. Barrientos in place draining urine. Kept clean and dry. 
All meds given as ordered. Safety measures in place. Will endorse to AM nurse for SHENG.

## 2020-08-21 NOTE — NUR
TELE RN  CLOSING NOTES



PT IN BED LYING COMFORTABLY AT MODERATE HIGH BACKREST POSITION. A/O X1. Kinyarwanda SPEAKING. ON 
O2 VIA N/C AT 2LPM, TOLERATING WELL WITH NO SOB NOTED.  DAVID TIPPLE LUMEN PICC LINE IN PLACE 
WITH IVF OF D5W INFUSING AT 75ML/HR. PT ALSO HAS PIV ON RAC G#20 INTACT AND PATENT. ON 
TELE-MONITORING WITH CURRENT READING OF SB WITH PAC'S AND HR ON THE 50'S. PT WITH DUNCAN IN 
PLACE DRAINING CLEAR YELLOW URINE VIA GRAVITY, DUNCAN CARE DONE. PT TURNED AND REPOSITIONED Q 
2HRS AND PRN. ALL NEEDS AND CARE PROVIDED WELL. SAFETY MEASURES KEPT IN PLACE: HOB KEPT 
ELEVATED. B/L SOFT WRIST RESTRAINTS IN PLACE TO PREVENT PULLING HIS LINES. BED IN LOWEST 
LOCKED POSITION WITH SIDE RAILS UP X3. CALL LIGHT WITHIN REACH.  WILL ENDORSE TO NIGHT SHIFT 
NURSE FOR SHENG

## 2020-08-21 NOTE — NUR
Tele RN opening notes

Received Pt from morning nurse. Pt is resting in bed comfortably. Pt is alert and 
orientedX1. Pt speaks Slovak and able to make needs known. Respiration is normal and 
unlabored. No SOB. No S/S of distress noted. tele monitor showed S honorio Hr at 51 bpm. PICC 
DAVID is clean, intact and patent. RAC# 20 is clean, intact and infusing well D5W@ 75 ml/hr. 
Barrientos cath is intact and draining yellow urine.  Bilateral soft wrists restraints is in 
placed, intact, skin is warm to touch and circulation is check. Keep Pt clean, dry and 
comfortable. Safety precautions is maintained. Bed at low position, brakes locked, side 
rails upX3 and call light is within reach. Will continue to monitor.

## 2020-08-21 NOTE — NUR
TELE RN NOTES



PT TRANSFERRED FROM ICU TO UNIT AT 1235 VIA PT'S BED ACCOMPANIED BY ICU NURSE DANIA. PT IS 
A/O X1. SLEEPY AND Swedish SPEAKING. PT ORIENTED TO RM AND STAFF. ON O2 VIA N/C AT 2LPM, 
TOLERATING WELL WITH NO SOB NOTED. PT WITH DAVID TIPPLE LUMEN PICC LINE WITH IVF OF D5W 
INFUSING AT 75ML/HR. PTBALSO HAS PIV ON RAC G#20 INTACT AND PATENT. PT PLACED ON 
TELEMONITORING WITH CURRENT READING OF SB WITH PAC'S AND BBB WITH HR ON THE 50'S. PT WITH 
DUNCAN IN PLACE DRAINING CLEAR YELLOW URINE VIA GRAVITY. SAFETY MEASURES INITIATED: HOB KEPT 
ELEVATED. PT KEPT ON B/L SOFT WRIST RESTRAINTS TO PREVENT PULLING HIS LINES. BEB PLACED IN 
LOWEST LOCKED POSITION WITH SIDE RAILS UP X3. CALL LIGHT WITHIN REACH. WILL CONTINUE TO 
MONITOR.

## 2020-08-22 NOTE — NUR
RN OPENING NOTES



RECEIVED PT IN BED LYING COMFORTABLY IN BED. PT IS ASLEEP BUT EASILY AROUSABLE TO VERBAL AND 
TACTILE STIMULI. PT IS Slovenian SPEAKING BUT ALSO ABLE TO SPEAK Rwandan. UNDERSTANDS SOME 
ENGLISH BUT VERY MINIMAL. ABLE TO FOLLOW SIMPLE COMMANDS. NO CARDIAC OR RESPIRATORY DISTRESS 
NOTED. NO SOB NOTED. SATURATING WELL ON ROOM AIR. BREATHING EVEN AND UNLABORED. ON CARDIAC 
TELE MONITOR SHOWING SINUS BEBE WITH CURRENT HR OF 56. DUNCAN CATH PRESENT. INTACT AND 
PATENT AND DRAINING WITH CLEAR YELLOW URINE. IV ACCESS NOTED ON R UPPER ARM PICC LINE AND R 
AC G20. BOTH IV ACCESS INTACT AND PATENT AND FLUSHING WELL. WITH D5W RUNNING AT 75ML/HR. 
TOLERATING IV FLUIDS WELL. BILATERAL SOFT WRIST RESTRAINTS NOTED TO PREVENT PULLING OUT OF 
TUBES AND LINES. NO S/S OF SKIN BREAKDOWN UNDERNEATH RESTRAINTS. WRIST RESTRAINTS NOTED WITH 
2 FINGERBREADTHS IN BETWEEN SKIN. SAFETY PRECAUTIONS IN PLACE. BED LOCKED AND IN LOW 
POSITION. SIDE RAILS UP X2. BED ALARM ON. CALL LIGHT WITHIN REACH. WILL CONT TO MONITOR.

## 2020-08-22 NOTE — NUR
K+ REPLACEMENT



K+ REPLACED TODAY. PT WAS GIVEN KCL 20MEQ POWDER PACKET X 1 DOSE SECONDARY TO K+ LEVEL OF 
3.3.

## 2020-08-22 NOTE — NUR
Tele RN closing notes

 Pt is resting in bed comfortably. Pt is alert and orientedX1. Pt speaks Danish and able to 
make needs known. Respiration is normal and unlabored. No SOB. No S/S of distress noted. Vs 
is stable. Afebrile. Tele monitor showed S honorio. Routine meds were given as ordered. PICC 
DAVID is clean, intact and patent. RAC# 20 is clean, intact and infusing well D5W@ 75 ml/hr. 
Barrientos cath is intact and draining yellow urine.  Bilateral soft wrists restraints is in 
placed, intact, skin is warm to touch and circulation is check. Kept Pt clean, dry and 
comfortable. All needs met and attended. Safety precautions is maintained. Bed at low 
position, brakes locked, side rails upX3, HOB elevated and call light is within reach. Will 
endorse to morning nurse for SHENG.

## 2020-08-22 NOTE — NUR
RECEIVE PT A/O X1 Yoruba SPEAKING 1:1 SITTER AT BEDSIDE, 2LPM VIA NC 02 SAT 95%. REMOVED & 
DC'D BILATERAL SOFT WRIST RESTRAINT ASSESS FOR RANGE OF MOTION AND EXERCISE HAND ABLE TO 
MOVE FREELY NO S/S OF IMPAIRED CIRCULATION, DAVID PICC LINE IN PLACE DRY DRESSING AND INTACT. 
NO NOTED PIV AT  RAC 20 PREVIOUS RN REMOVED IT. WITNESS BY SITTER. SAFETY MEASURES IN PLACE. 
WILL CONT TO MONITOR

## 2020-08-22 NOTE — NUR
RN CLOSING NOTES



PT IN BED LYING COMFORTABLY IN BED. PT IS ASLEEP BUT EASILY AROUSABLE TO VERBAL AND TACTILE 
STIMULI. PT IS Nepali SPEAKING BUT ALSO ABLE TO SPEAK Welsh. UNDERSTANDS SOME ENGLISH BUT 
VERY MINIMAL. ABLE TO FOLLOW SIMPLE COMMANDS. NO CARDIAC OR RESPIRATORY DISTRESS NOTED. NO 
SOB NOTED. SATURATING WELL ON ROOM AIR. BREATHING EVEN AND UNLABORED. ON CARDIAC TELE 
MONITOR SHOWING SINUS BEBE WITH CURRENT HR OF 56. DUNCAN CATH PRESENT. INTACT AND PATENT AND 
DRAINING WITH CLEAR YELLOW URINE. IV ACCESS NOTED ON R UPPER ARM PICC LINE AND R AC G20. 
BOTH IV ACCESS INTACT AND PATENT AND FLUSHING WELL. WITH D5W RUNNING AT 75ML/HR. TOLERATING 
IV FLUIDS WELL. BILATERAL SOFT WRIST RESTRAINTS NOTED TO PREVENT PULLING OUT OF TUBES AND 
LINES. NO S/S OF SKIN BREAKDOWN UNDERNEATH RESTRAINTS. WRIST RESTRAINTS NOTED WITH 2 
FINGERBREADTHS IN BETWEEN SKIN. PT NOTED WITH MINIMAL SWELLING ON L HAND. KEPT ELEVATED TO 
PILLOWS. PT ABLE TO PERFORM ACTIVE RANGE OF MOTION TO L HAND/WRIST WITH NO PAIN OR 
DIFFICULTIES. SAFETY PRECAUTIONS IN PLACE. BED LOCKED AND IN LOW POSITION. SIDE RAILS UP X2. 
BED ALARM ON. CALL LIGHT WITHIN REACH. WILL CONT TO MONITOR.

## 2020-08-23 NOTE — NUR
RN PM MS OPENING NOTES



REPORT RECIEVED FROM CARLA MORRIS. PT IN BED LYING COMFORTABLY AT SEMIFOWLERS POSITION. A/O 
X1. Estonian SPEAKING. ON O2 VIA N/C AT 2LPM, RESPE EVEN AND UNLABORED.  DAVID TIPPLE LUMEN PICC 
LINE IN PLACE HEPLOCKED. PT WITH DUNCAN IN PLACE DRAINING CLEAR YELLOW URINE VIA GRAVITY, .  
SAFETY MEASURES  IN PLACE, ASPIRATION PRECUATIONES IN PLACE PT HAS BILATERAL WRIST 
RESTRAINTS IN PLACE FOR PT TRYING TO GRAB TUBES AND LINES THAT ARE MEDICALLY NECESSARY. WILL 
CONT TO MONITOR PER PROTOCOL.

## 2020-08-23 NOTE — NUR
MS RN



RESUME ORDER OF BILATERAL SOFT WRIST RESTRAINT, NO SITTER AT THIS TIME. PT TRYING TO REMOVE 
PICC LINE DESPITE EXPLAINING RISKS AND BENEFITS.

## 2020-08-23 NOTE — NUR
MS RN



ASLEEP AND EASILY AWAKEN, 1:1 SITTER AT BEDSIDE,ASPIRATION PRECAUTION AT ALL TIMES.MONITORED 
FOR INTAKE. PT ENCOURAGE PO INTAKE AS TOLERATED, DAVID PICC LINE DRESSING INTACT AND PATENT, 
PT MONITORED ACCORDINGLY, 2LPM VIA NC 02 SAT AT 97%. KEPT CLEAN, DRY AND COMFORTABLE. NOT IN 
DISTRESS, AM CARE RENDERED, OFFLOAD HEELS AND ELBOWS AT ALL TIMES. SAFETY MEASURES AT ALL 
TIMES. WILL ENDORSE TO NEXT SHIFT.

## 2020-08-23 NOTE — NUR
RN OPENING NOTES



PT IN BED LYING COMFORTABLY AT MODERATE HIGH BACKREST POSITION. A/O X1. Greenlandic SPEAKING. ON 
O2 VIA N/C AT 2LPM, TOLERATING WELL WITH NO SOB NOTED.  DAVID TIPPLE LUMEN PICC LINE IN PLACE 
WITH IVF OF D5W INFUSING AT 75ML/HR. PT ALSO HAS PIV ON RAC G#20 INTACT AND PATENT.  PT WITH 
DUNCAN IN PLACE DRAINING CLEAR YELLOW URINE VIA GRAVITY, DUNCAN CARE DONE.  SAFETY MEASURES 
KEPT IN PLACE: HOB KEPT ELEVATED.

## 2020-08-23 NOTE — NUR
RN CLOSING  NOTES



WILL ENDORSED TO PM NURSE. PT IN BED LYING COMFORTABLY AT MODERATE HIGH BACKREST POSITION. 
A/O X1. Occitan SPEAKING. ON O2 VIA N/C AT 2LPM, TOLERATING WELL WITH NO SOB NOTED.  DAVID 
TIPPLE LUMEN PICC LINE IN PLACE WITH IVF OF D5W INFUSING AT 75ML/HR. PT ALSO HAS PIV ON RAC 
G#20 INTACT AND PATENT.  PT WITH DUNCAN IN PLACE DRAINING CLEAR YELLOW URINE VIA GRAVITY, 
DUNCAN CARE DONE.  SAFETY MEASURES KEPT IN PLACE: HOB KEPT ELEVATED.

## 2020-08-24 NOTE — NUR
MS/RN   Speech therapy



Seen by speech therapist - to remain on puree diet with honey thick liquids. Needs 
assistance with eating, strict aspiration precautions.

## 2020-08-24 NOTE — NUR
MS/RN   End note



Patient remains in stable condition. Restraints have not been required throughout the day as 
patient calm and cooperative. All medications administered as ordered, does not appear in 
any distress or discomfort. For possible discharge to home tomorrow.

## 2020-08-24 NOTE — NUR
MS/RN   S/B Dr Milligan



Seen by Dr Milligan - patient for possible discharge today back to SNF if able to tolerate 
diet.

## 2020-08-24 NOTE — NUR
MS/RN   Patient received



Patient received from night shift.

A/O to self, soft wrist restraints removed as patient calm and  cooperative at this time. 
Midline to right upper arm flushing well with normal saline. Barrientos catheter to gravity with 
minimal output at this time. 

Bed in low setting, side rails X3 in upright position, call light within reach. Bed alarm 
switched on for safety. Will continue to monitor and ensure safety.

## 2020-08-24 NOTE — NUR
MS RN NOTES



PATIENT IN BED, AWAKE, ALERT AND ORIENTED X 1. Belarusian SPEAKING ONLY. BREATHING EVEN AND 
UNLABORED ON 2L NC. SHOWS NO SIGNS OF ACUTE RESPIRATORY DISTRESS, NO ACUTE PAIN. FC CLEAN 
DRY AND INTACT. FLOWING YELLOW URINE. DAVID PICC INTACT AND IN PLACE. SHOWS NO SIGNS OF 
INFILTRATION, NO REDNESS. SAFETY PRECAUTIONS IN PLACE. BED IN LOWEST POSITION, LOCKED, AND 
CALL LIGHT KEPT WITHIN REACH. RESTRAINTS CURRENTLY OFF. PT NOT PULLING ANY LINES. WILL 
CONTINUE TO MONITOR.

## 2020-08-25 NOTE — NUR
RN DISCHARGED NOTES



PT DISCHARGED TO [facility]. REPORT GIVEN TO RNS KEYSHAWN. 
CALLED PT'S SON GARO AT [employer] # [phone] BUT UNABLE TO REACH AND LEFT MESSAGE. PT IS A/O 
X1-2. Chinese SPEAKING. V/S TAKEN, STABLE AND RECORDED. PHOTOS OF SKIN ISSUES TAKEN AND FILED 
IN PT'S CHART. PT HAS NO BELONGINGS.  IV ACCESS ON RAC G#22 NOT REMOVED, PT WILL CONTINUE 
WITH IV ATB OF MAXIPINE 1GM DAILY X 3 MORE DAYS. DUNCAN KEPT IN PLACE WITH CLEAR YELLOW URINE 
OUTPUT NOTED.  EXIT CARE FOLDER GIVEN TO PARAMEDICS. PT LEFT UNIT 1550 VIA GURNEY AND 
PORTABLE 02 VIA N/C ACCOMPANIED BY 2 EMT'S FROM [facility]. MD AND CHARGE 
NURSE AWARE OF DISCHARGE.

## 2020-08-25 NOTE — NUR
MS RN NOTES



PATIENT IN BED, ASLEEP, ALERT AND ORIENTED X 1. Albanian SPEAKING ONLY. BREATHING EVEN AND 
UNLABORED ON 2L NC. SHOWS NO SIGNS OF ACUTE RESPIRATORY DISTRESS, NO ACUTE PAIN. FC CLEAN 
DRY AND INTACT. FLOWING YELLOW URINE. RAC 22G ITS CLEAN DRY AND INTACT. SHOWS NO SIGNS OF 
INFILTRATION, NO REDNESS. L WRIST RESTRAINT ON, SHOWS NO SIGNS OF POOR CIRCULATION, NO SKIN 
TEARS. ALL DUE MEDICATIONS GIVEN. SAFETY PRECAUTIONS IN PLACE. BED IN LOWEST POSITION, 
LOCKED, AND CALL LIGHT KEPT WITHIN REACH.  PT NOT PULLING ANY LINES. WILL ENDORSE TO 
ONCOMING NURSE.

## 2020-08-25 NOTE — NUR
MS RN OPENING NOTES



RECEIVED PT AWAKE IN BED IN NO ACUTE SIGNS OF DISTRESS. A/O X1. Romansh SPEAKING. QUIET, NO 
FACIAL GRIMACES OR S/S OF PAIN NOTED AT THIS TIME. ON O2 VIA N/C AT 2LPM, RESPIRATIONS EVEN 
AND UNLABORED.  IV ACCESS ON RAC G#22 INTACT AND PATENT. PT WITH DUNCAN IN PLACE DRAINING 
CLEAR YELLOW URINE VIA GRAVITY. SAFETY MEASURES  IN PLACE: ASPIRATION PRECAUTION MAINTAINED, 
WRIST RESTRAINTS IN PLACE TO LEFT WRIST FOR PT TRYING TO GRAB TUBES AND LINES THAT ARE 
MEDICALLY NECESSARY, BED IN LOWEST LOCKED POSITION WITH SR UP X2. CALL LIGHT WITHIN REACH. 
WILL CONT TO MONITOR PT PER PROTOCOL.

## 2020-08-25 NOTE — NUR
RN NOTES



PT NOTED WITH LOW K 3.3. ADMINISTERED KDUR 20MEQ TABS X3 DOSES ASPER MD ORDER. WILL CONTINUE 
TO MONITOR.

## 2022-06-27 NOTE — NUR
For discharging drops for eye, next item caution putting lip and tongue   If worsening symptoms must see a regular doctor   pt is resting in the bed, awake, does not follow commands, confused, SR, SB, on 2L 02 sat 
well, NPO, failed swallow eval, f/c good urine output, v/s stable, no pain, pt cleaned, 
changed and repositioned q2hrs.

## 2023-02-10 NOTE — NUR
2/10/2023    CD consult acknowledged.  [facility] department does not do evals on the weekend and schedule is full for today.  Pt will be seen Monday or Tuesday for CD eval. If pt discharges this weekend, pt can call Mental Health and Addiction Services Line: [phone] and make an appt through [facility] for an evaluation and referrals to CD treatment.    Emerita Mcmillan [facility]  Phone: [phone]  Email: [email or web address]   RN OPENING NOTE: RECEIVED PATIENT IN BED THIS MORNING. PATIENT IS LETHARGIC, UNABLE TO 
FOLLOW COMMANDS. RUNNING ON O2 2L/MIN VIA NC, SATING WELL. NO SIGNS OF ACUTE DISTRESS NOTED 
AT THIS TIME. PATIENT IS ON LEVO TO KEEP REGULATE BP. PER NIGHT SHIFT RN, PATIENT HAS HAD SB 
SINCE LAST NIGHT, CURRENTLY IN THE HIGH 40S. WOUND CARE PER ORDERS. PATIENT HAS DUNCAN, 
DRAINING URINE. #20 LAC, #20 RAC, C/D/I, FLUSHING WELL, NO SIGNS OF COMPLICATIONS NOTED. 
AWAITING PICC LINE PLACEMENT. SAFETY MEASURES IMPLEMENTED, BED IN LOWEST POSITION, LOCKED, 
SIDE RAILS UP, CALL LIGHT WITHIN REACH. WILL CONTINUE TO MONITOR PATIENT FOR CHANGES.